# Patient Record
Sex: FEMALE | Race: WHITE | NOT HISPANIC OR LATINO | Employment: OTHER
[De-identification: names, ages, dates, MRNs, and addresses within clinical notes are randomized per-mention and may not be internally consistent; named-entity substitution may affect disease eponyms.]

---

## 2017-03-01 ENCOUNTER — RECORDS - HEALTHEAST (OUTPATIENT)
Dept: ADMINISTRATIVE | Facility: OTHER | Age: 37
End: 2017-03-01

## 2017-03-23 ENCOUNTER — RECORDS - HEALTHEAST (OUTPATIENT)
Dept: ADMINISTRATIVE | Facility: OTHER | Age: 37
End: 2017-03-23

## 2017-03-23 LAB
ABO + RH BLD: NORMAL
BLD GP AB SCN SERPL QL: NEGATIVE
C TRACH DNA SPEC QL PROBE+SIG AMP: NEGATIVE
HBV SURFACE AG SERPL QL IA: NORMAL
HCT VFR BLD AUTO: 39 %
HEMOGLOBIN: 13.2 G/DL (ref 11.7–15.7)
HIV 1+2 AB+HIV1 P24 AG SERPL QL IA: NORMAL
N GONORRHOEA DNA SPEC QL PROBE+SIG AMP: NEGATIVE
PLATELET # BLD AUTO: 229 10^9/L
RUBELLA ANTIBODY IGG QUANTITATIVE: NORMAL IU/ML
T PALLIDUM IGG SER QL: NORMAL

## 2017-04-17 ENCOUNTER — RECORDS - HEALTHEAST (OUTPATIENT)
Dept: ADMINISTRATIVE | Facility: OTHER | Age: 37
End: 2017-04-17

## 2017-04-20 ENCOUNTER — RECORDS - HEALTHEAST (OUTPATIENT)
Dept: ADMINISTRATIVE | Facility: OTHER | Age: 37
End: 2017-04-20

## 2017-05-17 ENCOUNTER — RECORDS - HEALTHEAST (OUTPATIENT)
Dept: ADMINISTRATIVE | Facility: OTHER | Age: 37
End: 2017-05-17

## 2017-05-18 ENCOUNTER — RECORDS - HEALTHEAST (OUTPATIENT)
Dept: ADMINISTRATIVE | Facility: OTHER | Age: 37
End: 2017-05-18

## 2017-06-12 ENCOUNTER — RECORDS - HEALTHEAST (OUTPATIENT)
Dept: ADMINISTRATIVE | Facility: OTHER | Age: 37
End: 2017-06-12

## 2017-06-15 ENCOUNTER — RECORDS - HEALTHEAST (OUTPATIENT)
Dept: ADMINISTRATIVE | Facility: OTHER | Age: 37
End: 2017-06-15

## 2017-06-22 ENCOUNTER — RECORDS - HEALTHEAST (OUTPATIENT)
Dept: ADMINISTRATIVE | Facility: OTHER | Age: 37
End: 2017-06-22

## 2017-06-23 ENCOUNTER — RECORDS - HEALTHEAST (OUTPATIENT)
Dept: ADMINISTRATIVE | Facility: OTHER | Age: 37
End: 2017-06-23

## 2017-06-27 ENCOUNTER — RECORDS - HEALTHEAST (OUTPATIENT)
Dept: ADMINISTRATIVE | Facility: OTHER | Age: 37
End: 2017-06-27

## 2017-06-30 ENCOUNTER — RECORDS - HEALTHEAST (OUTPATIENT)
Dept: ADMINISTRATIVE | Facility: OTHER | Age: 37
End: 2017-06-30

## 2017-07-01 ENCOUNTER — RECORDS - HEALTHEAST (OUTPATIENT)
Dept: ADMINISTRATIVE | Facility: OTHER | Age: 37
End: 2017-07-01

## 2017-07-18 ENCOUNTER — RECORDS - HEALTHEAST (OUTPATIENT)
Dept: ADMINISTRATIVE | Facility: OTHER | Age: 37
End: 2017-07-18

## 2017-07-31 ENCOUNTER — COMMUNICATION - HEALTHEAST (OUTPATIENT)
Dept: ADMINISTRATIVE | Facility: CLINIC | Age: 37
End: 2017-07-31

## 2017-08-24 ENCOUNTER — PRENATAL OFFICE VISIT (OUTPATIENT)
Dept: MIDWIFE SERVICES | Facility: CLINIC | Age: 37
End: 2017-08-24
Payer: COMMERCIAL

## 2017-08-24 VITALS
SYSTOLIC BLOOD PRESSURE: 117 MMHG | BODY MASS INDEX: 27.65 KG/M2 | WEIGHT: 169 LBS | TEMPERATURE: 97.7 F | DIASTOLIC BLOOD PRESSURE: 78 MMHG | HEART RATE: 69 BPM

## 2017-08-24 DIAGNOSIS — O09.529 AMA (ADVANCED MATERNAL AGE) MULTIGRAVIDA 35+, UNSPECIFIED TRIMESTER: Primary | ICD-10-CM

## 2017-08-24 PROBLEM — Z34.00 SUPERVISION OF NORMAL FIRST PREGNANCY: Status: RESOLVED | Noted: 2017-08-24 | Resolved: 2017-08-24

## 2017-08-24 PROBLEM — Z34.00 SUPERVISION OF NORMAL FIRST PREGNANCY: Status: ACTIVE | Noted: 2017-08-24

## 2017-08-24 PROBLEM — Z23 NEED FOR TDAP VACCINATION: Status: ACTIVE | Noted: 2017-08-24

## 2017-08-24 PROBLEM — D25.9 FIBROID UTERUS: Status: ACTIVE | Noted: 2017-08-24

## 2017-08-24 PROCEDURE — 99207 ZZC FIRST OB VISIT: CPT | Performed by: ADVANCED PRACTICE MIDWIFE

## 2017-08-24 NOTE — MR AVS SNAPSHOT
After Visit Summary   8/24/2017    Sophia Maynard    MRN: [id number]           Patient Information     Date Of Birth          1980        Visit Information        Provider Department      8/24/2017 1:30 PM Flaquita Bolden APRN CNM [facility]        Today's Diagnoses     AMA (advanced maternal age) multigravida 35+, unspecified trimester    -  1       Follow-ups after your visit        Who to contact     If you have questions or need follow up information about today's clinic visit or your schedule please contact [facility] directly at [phone].  Normal or non-critical lab and imaging results will be communicated to you by Preventicehart, letter or phone within 4 business days after the clinic has received the results. If you do not hear from us within 7 days, please contact the clinic through SolAeroMedt or phone. If you have a critical or abnormal lab result, we will notify you by phone as soon as possible.  Submit refill requests through Gold Lasso or call your pharmacy and they will forward the refill request to us. Please allow 3 business days for your refill to be completed.          Additional Information About Your Visit        MyChart Information     Gold Lasso gives you secure access to your electronic health record. If you see a primary care provider, you can also send messages to your care team and make appointments. If you have questions, please call your primary care clinic.  If you do not have a primary care provider, please call [phone] and they will assist you.        Care EveryWhere ID     This is your Care EveryWhere ID. This could be used by other organizations to access your [facility] medical records  [id number]        Your Vitals Were     Pulse Temperature Last Period Breastfeeding? BMI (Body Mass Index)       69 97.7  F (36.5  C) (Oral) 12/22/2016 Unknown 27.65 kg/m2        Blood Pressure from Last 3 Encounters:   08/24/17 117/78   09/13/16  128/84   06/07/09 120/78    Weight from Last 3 Encounters:   08/24/17 169 lb (76.7 kg)   09/13/16 148 lb 12.8 oz (67.5 kg)   06/07/09 134 lb 3.2 oz (60.9 kg)              We Performed the Following     ABO and Rh     Anti Treponema     CBC with platelets     Chlamydia trachomatis PCR     Hepatitis B surface antigen     HIV Antigen Antibody Combo     Neisseria gonorrhoeae PCR     OB hemoglobin     Rubella Antibody IgG Quantitative          Today's Medication Changes          These changes are accurate as of: 8/24/17  5:20 PM.  If you have any questions, ask your nurse or doctor.               Stop taking these medicines if you haven't already. Please contact your care team if you have questions.     ZITHROMAX Z-TOBY 250 MG tablet   Generic drug:  azithromycin   Stopped by:  Flaquita Bolden APRN CNM                    Primary Care Provider Office Phone # Fax #    Alexander Muller [phone]       [address] [address]        Equal Access to Services     [facility]: Hadii aad ku hadasho Soomaali, waaxda luqadaha, qaybta kaalmada adeegyada, waxay idiin haymatilda juarez . So [facility] [phone].    ATENCIÓN: Si habla español, tiene a gomez disposición servicios gratuitos de asistencia lingüística. [facility] [phone].    We comply with applicable federal civil rights laws and [place] laws. We do not discriminate on the basis of race, color, national origin, age, disability sex, sexual orientation or gender identity.            Thank you!     Thank you for choosing [facility]  for your care. Our goal is always to provide you with excellent care. Hearing back from our patients is one way we can continue to improve our services. Please take a few minutes to complete the written survey that you may receive in the mail after your visit with us. Thank you!             Your Updated Medication List - Protect others around you: Learn how to safely use,  store and throw away your medicines at www.disposemymeds.org.          This list is accurate as of: 8/24/17  5:20 PM.  Always use your most recent med list.                   Brand Name Dispense Instructions for use Diagnosis    PNV PO           VITAMIN D (CHOLECALCIFEROL) PO      Take by mouth daily

## 2017-08-24 NOTE — PROGRESS NOTES
34w3d   Sophia Maynard is a 37 year old who presents to the clinic for an new ob visit. She is not a previous [facility] patient. Patient is transfer from [facility]. Transferring because of risk of PPH due to fibroid. Patient had ultrasound with a perinatologist center and the fibroids are not by the cervix and only the largest are reported, seven total were seen. Discussed getting an ultrasound at 36 weeks for size and cephalic check due to inaccurate measurements from the fibroids. Patient reports she is measuring consistently 3 weeks ahead and prefers not to get ultrasounds unless they are going to change the plan. She will let us know if she agrees to the [facility] ultrasound at 36 weeks. Also on ultrasound unable to see CSP which can be associated with other brain abnormalities. Recommendation for fetal MRI and follow up ultrasound made. Patient declined further ultrasounds. Patient had birth tour yesterday. She is happy to deliver at [facility], she has heard a lot of good things.    Patient and  have no further questions or concerns today. Feeling well. They declined tdap vaccination today.     Estimated Date of Delivery: Oct 2, 2017 is calculated from Patient's last menstrual period was 12/22/2016.     She has not had bleeding since her LMP.   She has not had nausea. Weigh loss has not occurred.   This was a planned pregnancy.   NAT[place]NA[place] is involved, [place]  OTHER CONCERNS: no concerns     INFECTION HISTORY  HIV: no  Hepatitis B: no  Hepatitis C: no  Syphilis:  no  Tuberculosis: no   PPD- no  Herpes self: no  Herpes partner:  no  Chlamydia:  no  Gonorrhea:  no  HPV: no  BV:  no  Trichomonis:  no  Chicken Pox:  YES  ====================================================  GENETIC SCREENING  Genetic screening reviewed. High Risk? No  ====================================================  PERSONAL/SOCIAL HISTORY  Lives lives with their spouse.  Employment: Full time.  Her job involves light activity .  HX  OF ABUSE: no  =====================================================   REVIEW OF SYSTEMS  C: NEGATIVE for fever, chills  E: NEGATIVE for vision changes   R: NEGATIVE for significant cough or SOB  CV: NEGATIVE for chest pain, palpitations   GI: NEGATIVE for nausea, abdominal pain, heartburn, or change in bowel habits  : NEGATIVE for frequency, dysuria, or hematuria  M: NEGATIVE for significant arthralgias or myalgia  N: NEGATIVE for weakness, dizziness or paresthesias or headache  ====================================================    PHYSICAL EXAM:  /78  Pulse 69  Temp 97.7  F (36.5  C) (Oral)  Wt 169 lb (76.7 kg)  LMP 2016  BMI 27.65 kg/m2  BMI- Body mass index is 27.65 kg/(m^2).,     RECOMMENDED WEIGHT GAIN: 15-25 lbs.  PHQ9- Today's Depression Rating was No Value exists for the : HP#PHQ9  GENERAL:  Pleasant pregnant female, alert, well groomed.   SKIN:  Warm and dry, without lesions or rashes  HEAD: Symmetrical features.  MOUTH:  Buccal mucosa pink, moist without lesions.    NECK:  Thyroid without enlargement and nodules.  Lymph nodes not palpable.   LUNGS:  Clear to auscultation.  HEART:  RRR without murmur.  ABDOMEN: Soft without masses , tenderness or organomegaly.  No CVA tenderness. No scars noted.     MUSCULOSKELETAL:  Full range of motion  EXTREMITIES:  No edema. No significant varicosities.   GENITALIA: deferred.    =========================================  ASSESSMENT:  34w3d  AMA, first pregnancy   Fibroids    PREGNANCY AT RISK? no  ==========================================  PLAN:  Instructed on use of triage nurse line and contacting the on call CNM after hours for an urgent need such as fever, vagina bleeding, bladder or vaginal infection, rupture of membranes,  or term labor.    Instructed on best evidence for: weight gain for her BMI for pregnancy; healthy diet and foods to avoid; exercise and activity during pregnancy;avoiding exposure to toxoplasmosis; and  maintenance of a generally healthy lifestyle.   Discussed the harms, benefits, side effects and alternative therapies for current prescribed and OTC medications.  Follow up in 2 weeks for GBS and HGB. Discussed with patient recommendation for ultrasound at 36 weeks to follow up size and follow up fibroids and CSP evaluation. Patient and  have declined at this time. Will let us know if they change their mind.     TERRANCE Nunn CNM

## 2017-09-07 ENCOUNTER — PRENATAL OFFICE VISIT (OUTPATIENT)
Dept: MIDWIFE SERVICES | Facility: CLINIC | Age: 37
End: 2017-09-07
Payer: COMMERCIAL

## 2017-09-07 VITALS
TEMPERATURE: 97 F | DIASTOLIC BLOOD PRESSURE: 73 MMHG | BODY MASS INDEX: 28.47 KG/M2 | HEART RATE: 96 BPM | WEIGHT: 174 LBS | SYSTOLIC BLOOD PRESSURE: 114 MMHG

## 2017-09-07 DIAGNOSIS — O09.529 AMA (ADVANCED MATERNAL AGE) MULTIGRAVIDA 35+, UNSPECIFIED TRIMESTER: Primary | ICD-10-CM

## 2017-09-07 DIAGNOSIS — Z23 NEED FOR TDAP VACCINATION: ICD-10-CM

## 2017-09-07 LAB — HGB BLD-MCNC: 12.2 G/DL (ref 11.7–15.7)

## 2017-09-07 PROCEDURE — 87186 SC STD MICRODIL/AGAR DIL: CPT | Performed by: ADVANCED PRACTICE MIDWIFE

## 2017-09-07 PROCEDURE — 00000218 ZZHCL STATISTIC OBHBG - HEMOGLOBIN: Performed by: ADVANCED PRACTICE MIDWIFE

## 2017-09-07 PROCEDURE — 87653 STREP B DNA AMP PROBE: CPT | Performed by: ADVANCED PRACTICE MIDWIFE

## 2017-09-07 PROCEDURE — 99207 ZZC PRENATAL VISIT: CPT | Performed by: ADVANCED PRACTICE MIDWIFE

## 2017-09-07 PROCEDURE — 36416 COLLJ CAPILLARY BLOOD SPEC: CPT | Performed by: ADVANCED PRACTICE MIDWIFE

## 2017-09-07 RX ORDER — CHOLECALCIFEROL (VITAMIN D3) 125 MCG
CAPSULE ORAL
COMMUNITY
End: 2017-11-16

## 2017-09-07 NOTE — MR AVS SNAPSHOT
After Visit Summary   9/7/2017    Sophia Maynard    MRN: [id number]           Patient Information     Date Of Birth          1980        Visit Information        Provider Department      9/7/2017 10:15 AM Flaquita Bolden APRN CNM [facility]        Today's Diagnoses     AMA (advanced maternal age) multigravida 35+, unspecified trimester    -  1    Need for Tdap vaccination           Follow-ups after your visit        Who to contact     If you have questions or need follow up information about today's clinic visit or your schedule please contact [facility] directly at [phone].  Normal or non-critical lab and imaging results will be communicated to you by NantMobilehart, letter or phone within 4 business days after the clinic has received the results. If you do not hear from us within 7 days, please contact the clinic through NantMobilehart or phone. If you have a critical or abnormal lab result, we will notify you by phone as soon as possible.  Submit refill requests through [employer] or call your pharmacy and they will forward the refill request to us. Please allow 3 business days for your refill to be completed.          Additional Information About Your Visit        MyChart Information     [employer] gives you secure access to your electronic health record. If you see a primary care provider, you can also send messages to your care team and make appointments. If you have questions, please call your primary care clinic.  If you do not have a primary care provider, please call [phone] and they will assist you.        Care EveryWhere ID     This is your Care EveryWhere ID. This could be used by other organizations to access your [facility] medical records  [id number]        Your Vitals Were     Pulse Temperature Last Period BMI (Body Mass Index)          96 97  F (36.1  C) (Oral) 12/22/2016 28.47 kg/m2         Blood Pressure from Last 3 Encounters:   09/07/17 114/73    08/24/17 117/78   09/13/16 128/84    Weight from Last 3 Encounters:   09/07/17 174 lb (78.9 kg)   08/24/17 169 lb (76.7 kg)   09/13/16 148 lb 12.8 oz (67.5 kg)              We Performed the Following     Group B strep PCR     OB hemoglobin        Primary Care Provider Office Phone # Fax #    Alexander Muller [phone]       [address] [address]        Equal Access to Services     [facility] : Hadii aad ku hadasho Soomaali, waaxda luqadaha, qaybta kaalmada ademiguelinayabradly, mady juarez . So [facility] [phone].    ATENCIÓN: Si habla español, tiene a gomez disposición servicios gratuitos de asistencia lingüística. [facility] [phone].    We comply with applicable federal civil rights laws and [place] laws. We do not discriminate on the basis of race, color, national origin, age, disability sex, sexual orientation or gender identity.            Thank you!     Thank you for choosing [facility]  [place] your [place]. Our goal is always to provide you with excellent care. Hearing back from our patients is one way we can continue to improve our services. Please take a few minutes to complete the written survey that you may receive in the mail after your visit with us. Thank you!             Your Updated Medication List - Protect others around you: Learn how to safely use, store and throw away your medicines at www.disposemymeds.org.          This list is accurate as of: 9/7/17 11:48 AM.  Always use your most recent med list.                   Brand Name Dispense Instructions for use Diagnosis    DHA OMEGA 3 PO           PNV PO           PROBIOTIC ACIDOPHILUS Caps           VITAMIN D (CHOLECALCIFEROL) PO      Take by mouth daily

## 2017-09-07 NOTE — PROGRESS NOTES
36w3d  Patient feeling well. Positive fetal movement. Denies water leaking, vaginal bleeding, decreased fetal movement, contraction pain, or headaches.   Doing well and feeling well. Feeling more criselda radford contractions and lower pelvic pressure. She has no concerns today. Fetoscope only for fetal heart, good acceleration heard during exam. Hard to do leopolds with fibroids. Patient declined official ultrasound for size and cephalic check. Discussed it would be best to at least do a BSUS to check for position. She agrees to this but would like to wait until next visit.   GBS and HGB today. Declined tdap vaccination.    Danger signs reviewed, pre-eclampsia signs and symptoms discussed.   Knows when to call triage and has phone numbers.   Follow up in 1 week.   Flaquita Bolden CNM

## 2017-09-08 LAB
GP B STREP DNA SPEC QL NAA+PROBE: POSITIVE
SPECIMEN SOURCE: ABNORMAL

## 2017-09-12 PROBLEM — O99.820 GBS (GROUP B STREPTOCOCCUS CARRIER), +RV CULTURE, CURRENTLY PREGNANT: Status: ACTIVE | Noted: 2017-09-12

## 2017-09-12 LAB
BACTERIA SPEC CULT: ABNORMAL
SPECIMEN SOURCE: ABNORMAL

## 2017-09-14 ENCOUNTER — PRENATAL OFFICE VISIT (OUTPATIENT)
Dept: MIDWIFE SERVICES | Facility: CLINIC | Age: 37
End: 2017-09-14
Payer: COMMERCIAL

## 2017-09-14 VITALS
DIASTOLIC BLOOD PRESSURE: 70 MMHG | TEMPERATURE: 99.2 F | OXYGEN SATURATION: 100 % | SYSTOLIC BLOOD PRESSURE: 107 MMHG | HEART RATE: 93 BPM | WEIGHT: 175 LBS | BODY MASS INDEX: 28.63 KG/M2

## 2017-09-14 DIAGNOSIS — Z34.03 ENCOUNTER FOR SUPERVISION OF NORMAL FIRST PREGNANCY IN THIRD TRIMESTER: Primary | ICD-10-CM

## 2017-09-14 PROCEDURE — 99207 ZZC PRENATAL VISIT: CPT | Performed by: ADVANCED PRACTICE MIDWIFE

## 2017-09-14 NOTE — MR AVS SNAPSHOT
After Visit Summary   9/14/2017    Sophia Maynard    MRN: [id number]           Patient Information     Date Of Birth          1980        Visit Information        Provider Department      9/14/2017 10:15 AM Flaquita Bolden APRN CNM [facility]        Today's Diagnoses     Encounter for supervision of normal first pregnancy in third trimester    -  1       Follow-ups after your visit        Who to contact     If you have questions or need follow up information about today's clinic visit or your schedule please contact [facility] directly at [phone].  Normal or non-critical lab and imaging results will be communicated to you by Stack Exchangehart, letter or phone within 4 business days after the clinic has received the results. If you do not hear from us within 7 days, please contact the clinic through Stack Exchangehart or phone. If you have a critical or abnormal lab result, we will notify you by phone as soon as possible.  Submit refill requests through Guardian Analytics or call your pharmacy and they will forward the refill request to us. Please allow 3 business days for your refill to be completed.          Additional Information About Your Visit        MyChart Information     [employer] gives you secure access to your electronic health record. If you see a primary care provider, you can also send messages to your care team and make appointments. If you have questions, please call your primary care clinic.  If you do not have a primary care provider, please call [phone] and they will assist you.        Care EveryWhere ID     This is your Care EveryWhere ID. This could be used by other organizations to access your [facility] medical records  [id number]        Your Vitals Were     Pulse Temperature Last Period Pulse Oximetry BMI (Body Mass Index)       93 99.2  F (37.3  C) (Oral) 12/22/2016 100% 28.63 kg/m2        Blood Pressure from Last 3 Encounters:   09/14/17 107/70   09/07/17  114/73   08/24/17 117/78    Weight from Last 3 Encounters:   09/14/17 175 lb (79.4 kg)   09/07/17 174 lb (78.9 kg)   08/24/17 169 lb (76.7 kg)              Today, you had the following     No orders found for display       Primary Care Provider Office Phone # Fax #    Alexander Muller [phone]       [address] [address]        Equal Access to Services     [facility] : Hadii aad ku hadasho Soomaali, waaxda luqadaha, qaybta kaalmada adeegyada, waxay idiin hayaan adeeg brandi juarez . So [facility] [phone].    ATENCIÓN: Si habla español, tiene a gomez disposición servicios gratuitos de asistencia lingüística. [facility] [phone].    We comply with applicable federal civil rights laws and [place] laws. We do not discriminate on the basis of race, color, national origin, age, disability sex, sexual orientation or gender identity.            Thank you!     Thank you for choosing [facility]  for your care. Our goal is always to provide you with excellent care. Hearing back from our patients is one way we can continue to improve our services. Please take a few minutes to complete the written survey that you may receive in the mail after your visit with us. Thank you!             Your Updated Medication List - Protect others around you: Learn how to safely use, store and throw away your medicines at www.disposemymeds.org.          This list is accurate as of: 9/14/17  6:23 PM.  Always use your most recent med list.                   Brand Name Dispense Instructions for use Diagnosis    DHA OMEGA 3 PO           PNV PO           PROBIOTIC ACIDOPHILUS Caps           VITAMIN D (CHOLECALCIFEROL) PO      Take by mouth daily

## 2017-09-19 ENCOUNTER — PRENATAL OFFICE VISIT (OUTPATIENT)
Dept: MIDWIFE SERVICES | Facility: CLINIC | Age: 37
End: 2017-09-19
Payer: COMMERCIAL

## 2017-09-19 VITALS
TEMPERATURE: 97.5 F | SYSTOLIC BLOOD PRESSURE: 98 MMHG | WEIGHT: 176 LBS | DIASTOLIC BLOOD PRESSURE: 64 MMHG | BODY MASS INDEX: 28.8 KG/M2

## 2017-09-19 DIAGNOSIS — O09.529 AMA (ADVANCED MATERNAL AGE) MULTIGRAVIDA 35+, UNSPECIFIED TRIMESTER: Primary | ICD-10-CM

## 2017-09-19 PROCEDURE — 99207 ZZC PRENATAL VISIT: CPT | Performed by: ADVANCED PRACTICE MIDWIFE

## 2017-09-19 NOTE — MR AVS SNAPSHOT
After Visit Summary   9/19/2017    Sophia Maynard    MRN: [id number]           Patient Information     Date Of Birth          1980        Visit Information        Provider Department      9/19/2017 11:00 AM Merlin Henry APRN CNM [facility]        Today's Diagnoses     AMA (advanced maternal age) multigravida 35+, unspecified trimester    -  1       Follow-ups after your visit        Who to contact     If you have questions or need follow up information about today's clinic visit or your schedule please contact [facility] directly at [phone].  Normal or non-critical lab and imaging results will be communicated to you by Vaccsyshart, letter or phone within 4 business days after the clinic has received the results. If you do not hear from us within 7 days, please contact the clinic through Whiteyboardt or phone. If you have a critical or abnormal lab result, we will notify you by phone as soon as possible.  Submit refill requests through Aut[employer] or call your pharmacy and they will forward the refill request to us. Please allow 3 business days for your refill to be completed.          Additional Information About Your Visit        MyChart Information     [employer] gives you secure access to your electronic health record. If you see a primary care provider, you can also send messages to your care team and make appointments. If you have questions, please call your primary care clinic.  If you do not have a primary care provider, please call [phone] and they will assist you.        Care EveryWhere ID     This is your Care EveryWhere ID. This could be used by other organizations to access your [facility] medical records  [id number]        Your Vitals Were     Temperature Last Period BMI (Body Mass Index)             97.5  F (36.4  C) (Oral) 12/22/2016 28.8 kg/m2          Blood Pressure from Last 3 Encounters:   09/19/17 98/64   09/14/17 107/70   09/07/17 114/73    Weight  from Last 3 Encounters:   09/19/17 176 lb (79.8 kg)   09/14/17 175 lb (79.4 kg)   09/07/17 174 lb (78.9 kg)              Today, you had the following     No orders found for display       Primary Care Provider Office Phone # Fax #    Alexander Muller [phone]       [address] [address]        Equal Access to Services     [facility] : Hadii aad ku hadasho Soomaali, waaxda luqadaha, qaybta kaalmada adeegyada, mady obando hayjanesn cristopher juarez . So [facility] [phone].    ATENCIÓN: Si habla español, tiene a gomez disposición servicios gratuitos de asistencia lingüística. [facility] [phone].    We comply with applicable federal civil rights laws and [place] laws. We do not discriminate on the basis of race, color, national origin, age, disability sex, sexual orientation or gender identity.            Thank you!     Thank you for choosing [facility]  for your [place]. Our goal is always to provide you with excellent care. Hearing back from our patients is one way we can continue to improve our services. Please take a few minutes to complete the written survey that you may receive in the mail after your visit with us. Thank you!             Your Updated Medication List - Protect others around you: Learn how to safely use, store and throw away your medicines at www.disposemymeds.org.          This list is accurate as of: 9/19/17 11:54 AM.  Always use your most recent med list.                   Brand Name Dispense Instructions for use Diagnosis    DHA OMEGA 3 PO           PNV PO           PROBIOTIC ACIDOPHILUS Caps           VITAMIN D (CHOLECALCIFEROL) PO      Take by mouth daily

## 2017-09-19 NOTE — PROGRESS NOTES
38w1d  Patient feeling well.  Reports good fetal movement and irregular criselda radford contractions  Denies bleeding, leaking of fluid, painful contractions, and headaches.  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  Continues to have body work including adjustments and massage to manage pregnancy discomforts.    Discussed labor precautions and plan for labor support.  Has personal .  Return to clinic in 1 week.    Merlin Henry APRN, CNM

## 2017-09-24 ENCOUNTER — HEALTH MAINTENANCE LETTER (OUTPATIENT)
Age: 37
End: 2017-09-24

## 2017-09-29 ENCOUNTER — PRENATAL OFFICE VISIT (OUTPATIENT)
Dept: MIDWIFE SERVICES | Facility: CLINIC | Age: 37
End: 2017-09-29
Payer: COMMERCIAL

## 2017-09-29 VITALS
WEIGHT: 179 LBS | DIASTOLIC BLOOD PRESSURE: 76 MMHG | SYSTOLIC BLOOD PRESSURE: 110 MMHG | TEMPERATURE: 97.3 F | OXYGEN SATURATION: 97 % | BODY MASS INDEX: 29.29 KG/M2 | HEART RATE: 114 BPM

## 2017-09-29 DIAGNOSIS — O09.523 AMA (ADVANCED MATERNAL AGE) MULTIGRAVIDA 35+, THIRD TRIMESTER: ICD-10-CM

## 2017-09-29 DIAGNOSIS — O99.820 GBS (GROUP B STREPTOCOCCUS CARRIER), +RV CULTURE, CURRENTLY PREGNANT: Primary | ICD-10-CM

## 2017-09-29 DIAGNOSIS — O48.0 POST-TERM PREGNANCY, 40-42 WEEKS OF GESTATION: ICD-10-CM

## 2017-09-29 PROCEDURE — 99207 ZZC PRENATAL VISIT: CPT | Performed by: ADVANCED PRACTICE MIDWIFE

## 2017-09-29 PROCEDURE — 59425 ANTEPARTUM CARE ONLY: CPT | Performed by: ADVANCED PRACTICE MIDWIFE

## 2017-09-29 NOTE — MR AVS SNAPSHOT
After Visit Summary   9/29/2017    Sophia Maynard    MRN: [id number]           Patient Information     Date Of Birth          1980        Visit Information        Provider Department      9/29/2017 10:30 AM Merlin Henry APRN CNM [facility]        Today's Diagnoses     GBS (group B Streptococcus carrier), +RV culture, currently pregnant    -  1    AMA (advanced maternal age) multigravida 35+, third trimester        Post-term pregnancy, 40-42 weeks of gestation           Follow-ups after your visit        Your next 10 appointments already scheduled     Oct 09, 2017 11:40 AM CDT   US OB SINGLE FOLLOW UP REPEAT with RDUS1   [facility]    [address] [place] [address] [address]  [facility] [address] [phone]           Please bring a list of your medicines (including vitamins, minerals and over-the-counter drugs). Also, tell your doctor about any allergies you may have. Wear comfortable clothes and leave your valuables at home.  If you re less than 20 weeks drink four 8-ounce glasses of fluid an hour before your exam. If you need to empty your bladder before your exam, try to release only a little urine. Then, drink another glass of fluid.  You may have up to two family members in the exam room. If you bring a small child, an adult must be there to care for him or her.  Please call the Imaging Department at your exam site with any questions.            Oct 09, 2017 12:30 PM CDT   ESTABLISHED PRENATAL with TERRANCE Munoz CNM, RD NON STRESS TEST RM   [facility] [place]    [address] [place] [address] [address]  [facility] [address] [phone]              Future tests that were ordered for you today     Open Future Orders        Priority Expected Expires Ordered    US OB Single Follow Up Repeat Routine 10/9/2017 9/29/2018 9/29/2017            Who to contact     If you have questions or need  follow up information about today's clinic visit or your schedule please contact [facility] directly at [phone].  Normal or non-critical lab and imaging results will be communicated to you by MyChart, letter or phone within 4 business days after the clinic has received the results. If you do not hear from us within 7 days, please contact the clinic through [employer] or phone. If you have a critical or abnormal lab result, we will notify you by phone as soon as possible.  Submit refill requests through [employer] or call your pharmacy and they will forward the refill request to us. Please allow 3 business days for your refill to be completed.          Additional Information About Your Visit        [employer] Information     [employer] gives you secure access to your electronic health record. If you see a primary care provider, you can also send messages to your care team and make appointments. If you have questions, please call your primary care clinic.  If you do not have a primary care provider, please call [phone] and they will assist you.        Care EveryWhere ID     This is your Care EveryWhere ID. This could be used by other organizations to access your [facility] medical records  [id number]        Your Vitals Were     Pulse Temperature Last Period Pulse Oximetry BMI (Body Mass Index)       114 97.3  F (36.3  C) (Oral) 12/22/2016 97% 29.29 kg/m2        Blood Pressure from Last 3 Encounters:   09/29/17 110/76   09/19/17 98/64   09/14/17 107/70    Weight from Last 3 Encounters:   09/29/17 179 lb (81.2 kg)   09/19/17 176 lb (79.8 kg)   09/14/17 175 lb (79.4 kg)               Primary Care Provider Office Phone # Fax #    Alexander Muller [phone]       [address] [address]        Equal Access to Services     DICK CASAS : Luna Jiménez, waaxda luqadaha, qaybta kaalmabradly gordon, mady alonzo. So [facility]  [phone].    ATENCIÓN: Si nataliya dias, tiene a gomez disposición servicios gratuitos de asistencia lingüística. Anahi al [phone].    We comply with applicable federal civil rights laws and [place] laws. We do not discriminate on the basis of race, color, national origin, age, disability, sex, sexual orientation, or gender identity.            Thank you!     Thank you for choosing [facility]  [place]. Our goal is always to provide you with excellent care. Hearing back from our patients is one way we can continue to improve our services. Please take a few minutes to complete the written survey that you may receive in the mail after your visit with us. Thank you!             Your Updated Medication List - Protect others around you: Learn how to safely use, store and throw away your medicines at www.disposemymeds.org.          This list is accurate as of: 9/29/17 12:27 PM.  Always use your most recent med list.                   Brand Name Dispense Instructions for use Diagnosis    DHA OMEGA 3 PO           PNV PO           PROBIOTIC ACIDOPHILUS Caps           VITAMIN D (CHOLECALCIFEROL) PO      Take by mouth daily

## 2017-09-29 NOTE — PROGRESS NOTES
Doing well.  Is off work on Maternity leave but has house closing 10/16/17 so still has lots to do.  Recommended sleeping as much as possible.  Discussed post dates mgt.  Would want to go to 42 weeks before IOL.  BP is WNL.  Reviewed S/S of PIH.  ASSESSMENT: 39w4d   Fibroid uterus.  GBS positive.   PLAN: RTC on 10/9 at 41 wks for post dates mgt.    RADHA

## 2017-10-04 ENCOUNTER — TELEPHONE (OUTPATIENT)
Dept: MIDWIFE SERVICES | Facility: CLINIC | Age: 37
End: 2017-10-04

## 2017-10-04 NOTE — TELEPHONE ENCOUNTER
Patient is 40w2d, calling in regards to losing part of her mucus plug last night. Had cervical mucus discharge with a pink tinge when wiping. No contractions, no LOF, +FM. Patient is GBS+, Rh neg. Discussed when to call in regards to laboring. Routing to [facility] as FYI. Thanks.   Yoselin Valdes, RN-BSN

## 2017-10-06 ENCOUNTER — HOSPITAL ENCOUNTER (INPATIENT)
Facility: CLINIC | Age: 37
LOS: 2 days | Discharge: HOME | End: 2017-10-08
Attending: ADVANCED PRACTICE MIDWIFE | Admitting: ADVANCED PRACTICE MIDWIFE
Payer: COMMERCIAL

## 2017-10-06 ENCOUNTER — NURSE TRIAGE (OUTPATIENT)
Dept: NURSING | Facility: CLINIC | Age: 37
End: 2017-10-06

## 2017-10-06 LAB
ABO + RH BLD: NORMAL
ABO + RH BLD: NORMAL
BLD GP AB SCN SERPL QL: NORMAL
BLOOD BANK CMNT PATIENT-IMP: NORMAL
BLOOD BANK CMNT PATIENT-IMP: NORMAL
HGB BLD-MCNC: 13.2 G/DL (ref 11.7–15.7)
SPECIMEN EXP DATE BLD: NORMAL
T PALLIDUM IGG+IGM SER QL: NEGATIVE

## 2017-10-06 PROCEDURE — S0191 MISOPROSTOL, ORAL, 200 MCG: HCPCS

## 2017-10-06 PROCEDURE — 25000128 H RX IP 250 OP 636: Performed by: ADVANCED PRACTICE MIDWIFE

## 2017-10-06 PROCEDURE — 0KQM0ZZ REPAIR PERINEUM MUSCLE, OPEN APPROACH: ICD-10-PCS | Performed by: ADVANCED PRACTICE MIDWIFE

## 2017-10-06 PROCEDURE — 86900 BLOOD TYPING SEROLOGIC ABO: CPT | Performed by: ADVANCED PRACTICE MIDWIFE

## 2017-10-06 PROCEDURE — 12000030 ZZH R&B OB INTERMEDIATE UMMC

## 2017-10-06 PROCEDURE — 25000132 ZZH RX MED GY IP 250 OP 250 PS 637: Performed by: ADVANCED PRACTICE MIDWIFE

## 2017-10-06 PROCEDURE — 72200001 ZZH LABOR CARE VAGINAL DELIVERY SINGLE

## 2017-10-06 PROCEDURE — 86780 TREPONEMA PALLIDUM: CPT | Performed by: ADVANCED PRACTICE MIDWIFE

## 2017-10-06 PROCEDURE — 85018 HEMOGLOBIN: CPT | Performed by: ADVANCED PRACTICE MIDWIFE

## 2017-10-06 PROCEDURE — 86901 BLOOD TYPING SEROLOGIC RH(D): CPT | Performed by: ADVANCED PRACTICE MIDWIFE

## 2017-10-06 PROCEDURE — 59410 OBSTETRICAL CARE: CPT | Performed by: ADVANCED PRACTICE MIDWIFE

## 2017-10-06 PROCEDURE — 25000125 ZZHC RX 250

## 2017-10-06 PROCEDURE — 25000125 ZZHC RX 250: Performed by: ADVANCED PRACTICE MIDWIFE

## 2017-10-06 PROCEDURE — 25000132 ZZH RX MED GY IP 250 OP 250 PS 637

## 2017-10-06 PROCEDURE — 36415 COLL VENOUS BLD VENIPUNCTURE: CPT | Performed by: ADVANCED PRACTICE MIDWIFE

## 2017-10-06 PROCEDURE — 86850 RBC ANTIBODY SCREEN: CPT | Performed by: ADVANCED PRACTICE MIDWIFE

## 2017-10-06 RX ORDER — HYDROCORTISONE 2.5 %
CREAM (GRAM) TOPICAL 3 TIMES DAILY PRN
Status: DISCONTINUED | OUTPATIENT
Start: 2017-10-06 | End: 2017-10-08 | Stop reason: HOSPADM

## 2017-10-06 RX ORDER — AMOXICILLIN 250 MG
1-2 CAPSULE ORAL 2 TIMES DAILY
Status: DISCONTINUED | OUTPATIENT
Start: 2017-10-06 | End: 2017-10-08 | Stop reason: HOSPADM

## 2017-10-06 RX ORDER — ACETAMINOPHEN 325 MG/1
650 TABLET ORAL EVERY 4 HOURS PRN
Status: DISCONTINUED | OUTPATIENT
Start: 2017-10-06 | End: 2017-10-08 | Stop reason: HOSPADM

## 2017-10-06 RX ORDER — LANOLIN 100 %
OINTMENT (GRAM) TOPICAL
Status: DISCONTINUED | OUTPATIENT
Start: 2017-10-06 | End: 2017-10-08 | Stop reason: HOSPADM

## 2017-10-06 RX ORDER — BISACODYL 10 MG
10 SUPPOSITORY, RECTAL RECTAL DAILY PRN
Status: DISCONTINUED | OUTPATIENT
Start: 2017-10-08 | End: 2017-10-08 | Stop reason: HOSPADM

## 2017-10-06 RX ORDER — METHYLERGONOVINE MALEATE 0.2 MG/ML
200 INJECTION INTRAVENOUS
Status: DISCONTINUED | OUTPATIENT
Start: 2017-10-06 | End: 2017-10-06

## 2017-10-06 RX ORDER — OXYCODONE HYDROCHLORIDE 5 MG/1
5-10 TABLET ORAL
Status: DISCONTINUED | OUTPATIENT
Start: 2017-10-06 | End: 2017-10-08 | Stop reason: HOSPADM

## 2017-10-06 RX ORDER — NALOXONE HYDROCHLORIDE 0.4 MG/ML
.1-.4 INJECTION, SOLUTION INTRAMUSCULAR; INTRAVENOUS; SUBCUTANEOUS
Status: DISCONTINUED | OUTPATIENT
Start: 2017-10-06 | End: 2017-10-06

## 2017-10-06 RX ORDER — OXYTOCIN/0.9 % SODIUM CHLORIDE 30/500 ML
100-340 PLASTIC BAG, INJECTION (ML) INTRAVENOUS CONTINUOUS PRN
Status: DISCONTINUED | OUTPATIENT
Start: 2017-10-06 | End: 2017-10-06

## 2017-10-06 RX ORDER — PENICILLIN G POTASSIUM 5000000 [IU]/1
5 INJECTION, POWDER, FOR SOLUTION INTRAMUSCULAR; INTRAVENOUS ONCE
Status: COMPLETED | OUTPATIENT
Start: 2017-10-06 | End: 2017-10-06

## 2017-10-06 RX ORDER — OXYTOCIN 10 [USP'U]/ML
10 INJECTION, SOLUTION INTRAMUSCULAR; INTRAVENOUS
Status: DISCONTINUED | OUTPATIENT
Start: 2017-10-06 | End: 2017-10-08 | Stop reason: HOSPADM

## 2017-10-06 RX ORDER — SODIUM CHLORIDE, SODIUM LACTATE, POTASSIUM CHLORIDE, CALCIUM CHLORIDE 600; 310; 30; 20 MG/100ML; MG/100ML; MG/100ML; MG/100ML
INJECTION, SOLUTION INTRAVENOUS CONTINUOUS
Status: DISCONTINUED | OUTPATIENT
Start: 2017-10-06 | End: 2017-10-06

## 2017-10-06 RX ORDER — MISOPROSTOL 200 UG/1
TABLET ORAL
Status: COMPLETED
Start: 2017-10-06 | End: 2017-10-06

## 2017-10-06 RX ORDER — IBUPROFEN 400 MG/1
400-800 TABLET, FILM COATED ORAL EVERY 6 HOURS PRN
Status: DISCONTINUED | OUTPATIENT
Start: 2017-10-06 | End: 2017-10-08 | Stop reason: HOSPADM

## 2017-10-06 RX ORDER — LIDOCAINE HYDROCHLORIDE 10 MG/ML
INJECTION, SOLUTION EPIDURAL; INFILTRATION; INTRACAUDAL; PERINEURAL
Status: COMPLETED
Start: 2017-10-06 | End: 2017-10-06

## 2017-10-06 RX ORDER — ACETAMINOPHEN 325 MG/1
650 TABLET ORAL EVERY 4 HOURS PRN
Status: DISCONTINUED | OUTPATIENT
Start: 2017-10-06 | End: 2017-10-06

## 2017-10-06 RX ORDER — FENTANYL CITRATE 50 UG/ML
50-100 INJECTION, SOLUTION INTRAMUSCULAR; INTRAVENOUS
Status: DISCONTINUED | OUTPATIENT
Start: 2017-10-06 | End: 2017-10-06

## 2017-10-06 RX ORDER — OXYTOCIN 10 [USP'U]/ML
10 INJECTION, SOLUTION INTRAMUSCULAR; INTRAVENOUS
Status: DISCONTINUED | OUTPATIENT
Start: 2017-10-06 | End: 2017-10-06

## 2017-10-06 RX ORDER — OXYTOCIN/0.9 % SODIUM CHLORIDE 30/500 ML
340 PLASTIC BAG, INJECTION (ML) INTRAVENOUS CONTINUOUS PRN
Status: DISCONTINUED | OUTPATIENT
Start: 2017-10-06 | End: 2017-10-08 | Stop reason: HOSPADM

## 2017-10-06 RX ORDER — OXYTOCIN 10 [USP'U]/ML
INJECTION, SOLUTION INTRAMUSCULAR; INTRAVENOUS
Status: DISCONTINUED
Start: 2017-10-06 | End: 2017-10-06 | Stop reason: HOSPADM

## 2017-10-06 RX ORDER — CARBOPROST TROMETHAMINE 250 UG/ML
250 INJECTION, SOLUTION INTRAMUSCULAR
Status: DISCONTINUED | OUTPATIENT
Start: 2017-10-06 | End: 2017-10-06

## 2017-10-06 RX ORDER — OXYTOCIN/0.9 % SODIUM CHLORIDE 30/500 ML
PLASTIC BAG, INJECTION (ML) INTRAVENOUS
Status: DISCONTINUED
Start: 2017-10-06 | End: 2017-10-06 | Stop reason: HOSPADM

## 2017-10-06 RX ORDER — METHYLERGONOVINE MALEATE 0.2 MG/1
200 TABLET ORAL EVERY 6 HOURS SCHEDULED
Status: DISCONTINUED | OUTPATIENT
Start: 2017-10-06 | End: 2017-10-08 | Stop reason: HOSPADM

## 2017-10-06 RX ORDER — IBUPROFEN 800 MG/1
800 TABLET, FILM COATED ORAL
Status: DISCONTINUED | OUTPATIENT
Start: 2017-10-06 | End: 2017-10-06

## 2017-10-06 RX ORDER — NALOXONE HYDROCHLORIDE 0.4 MG/ML
.1-.4 INJECTION, SOLUTION INTRAMUSCULAR; INTRAVENOUS; SUBCUTANEOUS
Status: DISCONTINUED | OUTPATIENT
Start: 2017-10-06 | End: 2017-10-08 | Stop reason: HOSPADM

## 2017-10-06 RX ORDER — OXYCODONE AND ACETAMINOPHEN 5; 325 MG/1; MG/1
1 TABLET ORAL
Status: DISCONTINUED | OUTPATIENT
Start: 2017-10-06 | End: 2017-10-06

## 2017-10-06 RX ORDER — ONDANSETRON 2 MG/ML
4 INJECTION INTRAMUSCULAR; INTRAVENOUS EVERY 6 HOURS PRN
Status: DISCONTINUED | OUTPATIENT
Start: 2017-10-06 | End: 2017-10-06

## 2017-10-06 RX ORDER — MISOPROSTOL 200 UG/1
400 TABLET ORAL
Status: DISCONTINUED | OUTPATIENT
Start: 2017-10-06 | End: 2017-10-08 | Stop reason: HOSPADM

## 2017-10-06 RX ORDER — OXYTOCIN/0.9 % SODIUM CHLORIDE 30/500 ML
100 PLASTIC BAG, INJECTION (ML) INTRAVENOUS CONTINUOUS
Status: DISCONTINUED | OUTPATIENT
Start: 2017-10-06 | End: 2017-10-08 | Stop reason: HOSPADM

## 2017-10-06 RX ADMIN — OXYTOCIN-SODIUM CHLORIDE 0.9% IV SOLN 30 UNIT/500ML 100 ML/HR: 30-0.9/5 SOLUTION at 14:37

## 2017-10-06 RX ADMIN — MISOPROSTOL 400 MCG: 200 TABLET ORAL at 12:34

## 2017-10-06 RX ADMIN — LIDOCAINE HYDROCHLORIDE 10 ML: 10 INJECTION, SOLUTION EPIDURAL; INFILTRATION; INTRACAUDAL; PERINEURAL at 12:43

## 2017-10-06 RX ADMIN — PENICILLIN G POTASSIUM 5 MILLION UNITS: 5000000 POWDER, FOR SOLUTION INTRAMUSCULAR; INTRAPLEURAL; INTRATHECAL; INTRAVENOUS at 10:25

## 2017-10-06 RX ADMIN — IBUPROFEN 800 MG: 800 TABLET ORAL at 13:35

## 2017-10-06 RX ADMIN — ACETAMINOPHEN 650 MG: 325 TABLET, FILM COATED ORAL at 17:56

## 2017-10-06 RX ADMIN — ACETAMINOPHEN 650 MG: 325 TABLET, FILM COATED ORAL at 22:18

## 2017-10-06 RX ADMIN — METHYLERGONOVINE MALEATE 200 MCG: 0.2 TABLET ORAL at 14:14

## 2017-10-06 RX ADMIN — IBUPROFEN 800 MG: 800 TABLET ORAL at 20:21

## 2017-10-06 RX ADMIN — OXYTOCIN-SODIUM CHLORIDE 0.9% IV SOLN 30 UNIT/500ML 100 ML/HR: 30-0.9/5 SOLUTION at 12:40

## 2017-10-06 RX ADMIN — METHYLERGONOVINE MALEATE 200 MCG: 0.2 TABLET ORAL at 20:21

## 2017-10-06 RX ADMIN — FENTANYL CITRATE 100 MCG: 50 INJECTION INTRAMUSCULAR; INTRAVENOUS at 12:43

## 2017-10-06 NOTE — PLAN OF CARE
Unable to obtain IV access. Anesthesia contacted. Pt GBS+ and has multiple fibroids. Plan to continue to attempt access.

## 2017-10-06 NOTE — PLAN OF CARE
Problem: Labor (Cervical Ripen, Induct, Augment) (Adult,Obstetrics,Pediatric)  Goal: Signs and Symptoms of Listed Potential Problems Will be Absent, Minimized or Managed (Labor)  Signs and symptoms of listed potential problems will be absent, minimized or managed by discharge/transition of care (reference Labor (Cervical Ripen, Induct, Augment) (Adult,Obstetrics,Pediatric) CPG).   Outcome: Improving  Pt tolerating labor well. Utilizing breathing and positioning for comfort. Spouse and  present and supportive. Membranes remain intact. Continuous monitoring initiated for decelerations. Plan to continue labor as present.

## 2017-10-06 NOTE — PROGRESS NOTES
Blood pressure 154/74, pulse 88, temperature 98.1  F (36.7  C), temperature source Oral, resp. rate 18, weight 81.2 kg (179 lb), last menstrual period 2016, unknown if currently breastfeeding.  Patient Vitals for the past 24 hrs:   BP Temp Temp src Pulse Resp Weight   10/06/17 0520 154/74 98.1  F (36.7  C) Oral 88 18 81.2 kg (179 lb)     General appearance: uncomfortable with contractions  Involuntary pushing with contractions  CONTACTIONS: moderate and every 2-3 minutes  Pitocin- none,  Antibiotics- none  FETAL HEART TONES: continuous EFM- baseline 130 with moderate variability.  Accelerations- present.  Decelerations- variable once down to 70's with change in position, returning to baseline within 30 seconds  ROM: not ruptured  PELVIC EXAM:9.5, anterior lip 100%/ Mid/ soft/ 0   ASSESSMENT:  ==============  IUP @ 40w4d in transition labor   Uterine fibroids  Fetal Heart Rate Tracing category one  GBS- positive- not treated    PLAN:  ===========  Patient will allow to involuntary push for now, enc. Breathing.   IV started by anesthesia at 0900.     Anticipate 2nd stage soon  Anticipate   TERRANCE Edwards CNM

## 2017-10-06 NOTE — PROGRESS NOTES
Patient arrived to [facility] unit via wheelchair at 1630,with belongings, accompanied by spouse/ significant other, with infant in arms. Received report from KATIE Cristobal  and checked bands. Unit and room orientation started. Call light within arms reach.  ID bands double-checked.  Continue with plan of care.

## 2017-10-06 NOTE — PLAN OF CARE
Data: [facility] at 0[facility].   Reason for maternal/fetal assessment per patient is labor, coontx, possible srom.  Patient is a . Prenatal record reviewed.      Obstetric History       T0      L0     SAB0   TAB0   Ectopic0   Multiple0   Live Births0       # Outcome Date GA Lbr Cliff/2nd Weight Sex Delivery Anes PTL Lv   1 Current               . Medical history:   Past Medical History:   Diagnosis Date     Uncomplicated asthma 0700-8693   . Gestational Age 40w4d. VSS. Fetal movement present. Patient denies cramping, backache, vaginal discharge, pelvic pressure, UTI symptoms, GI problems, vaginal bleeding, edema, headache, visual disturbances, epigastric or URQ pain, abdominal pain. Support persons ORVILLE&DANIEL present.  Action: Verbal consent for EFM. Triage assessment completed. EFM applied, Uterine assessment SOFT, NONTENDER. Fetal assessment: Presumed adequate fetal oxygenation documented (see flow record).   Response:LULY STILL CNM informed of PT ARRIVAL. Plan per provider is SVE/ASSESSMENT. Patient verbalized agreement with plan. Patient to room 456 ambulatory, oriented to room and call light.

## 2017-10-06 NOTE — IP AVS SNAPSHOT
MRN:[id number]                      After Visit Summary   10/6/2017    Sophia Maynard    MRN: [id number]           Thank you!     Thank you for choosing [facility] for your care. Our goal is always to provide you with excellent care. Hearing back from our patients is one way we can continue to improve our services. Please take a few minutes to complete the written survey that you may receive in the mail after you visit with us. Thank you!        Patient Information     Date Of Birth          1980        Designated Caregiver       Most Recent Value    Caregiver    Will someone help with your care after discharge? no      About your hospital stay     You were admitted on:  October 6, 2017 You last received care in the:  [facility]    You were discharged on:  October 8, 2017       Who to Call     For medical emergencies, please call 911.  For non-urgent questions about your medical care, please call your primary care provider or clinic, [phone]          Attending Provider     Provider Specialty    Merlin Henry APRN CNM MIDWIFE    Estela Jurado APRN CNM Midwives       Primary Care Provider Office Phone # Fax #    Alexander Muller [phone]      Your next 10 appointments already scheduled     Oct 09, 2017 11:40 AM CDT   US OB SINGLE FOLLOW UP REPEAT with RDUS1   [facility] [place]    [address] [place] [address] [phone]           Please bring a list of your medicines (including vitamins, minerals and over-the-counter drugs). Also, tell your doctor about any allergies you may have. Wear comfortable clothes and leave your valuables at home.  If you re less than 20 weeks drink four 8-ounce glasses of fluid an hour before your exam. If you need to empty your bladder before your exam, try to release only a little urine. Then, drink another glass of fluid.  You may have up to two family members in the  exam room. If you bring a small child, an adult must be there to care for him or her.  Please call the Imaging Department at your exam site with any questions.            Oct 09, 2017 12:30 PM CDT   ESTABLISHED PRENATAL with TERRANCE Munoz CNM, RD NON STRESS TEST [facility] ([facility]    [address] [place] [address] [phone]              Further instructions from your care team       Postpartum Vaginal Delivery Instructions    Activity       Ask family and friends for help when you need it.    Do not place anything in your vagina for 6 weeks.    You are not restricted on other activities, but take it easy for a few weeks to allow your body to recover from delivery.  You are able to do any activities you feel up to that point.    No driving until you have stopped taking your pain medications (usually two weeks after delivery).     Call your health care provider if you have any of these symptoms:       Increased pain, swelling, redness, or fluid around your stiches from an episiotomy or perineal tear.    A fever above 100.4 F (38 C) with or without chills when placing a thermometer under your tongue.    You soak a sanitary pad with blood within 1 hour, or you see blood clots larger than a golf ball.    Bleeding that lasts more than 6 weeks.    Vaginal discharge that smells bad.    Severe pain, cramping or tenderness in your lower belly area.    A need to urinate more frequently (use the toilet more often), more urgently (use the toilet very quickly), or it burns when you urinate.    Nausea and vomiting.    Redness, swelling or pain around a vein in your leg.    Problems breastfeeding or a red or painful area on your breast.    Chest pain and cough or are gasping for air.    Problems coping with sadness, anxiety, or depression.  If you have any concerns about hurting yourself or the baby, call your provider immediately.     You have  questions or concerns after you return home.     Keep your hands clean:  Always wash your hands before touching your perineal area and stitches.  This helps reduce your risk of infection.  If your hands aren't dirty, you may use an alcohol hand-rub to clean your hands. Keep your nails clean and short.        Pending Results     No orders found from 10/4/2017 to 10/7/2017.            Statement of Approval     Ordered          10/08/17 0933  I have reviewed and agree with all the recommendations and orders detailed in this document.  EFFECTIVE NOW     Approved and electronically signed by:  Estela Jurado APRN CNM             Admission Information     Date & Time Provider Department Dept. Phone    10/6/2017 Estela Jurado APRN CNM [facility] [phone]      Your Vitals Were     Blood Pressure Pulse Temperature Respirations Weight Last Period    133/81 83 98  F (36.7  C) (Oral) 16 81.2 kg (179 lb) 12/22/2016    BMI (Body Mass Index)                   29.29 kg/m2           [employer] Information     [employer] gives you secure access to your electronic health record. If you see a primary care provider, you can also send messages to your care team and make appointments. If you have questions, please call your primary care clinic.  If you do not have a primary care provider, please call [phone] and they will assist you.        Care EveryWhere ID     This is your Care EveryWhere ID. This could be used by other organizations to access your [facility] medical records  [id number]        Equal Access to Services     DICK CASAS : Hadii jose Jiménez, waaxda luqadaha, qaybta kaalmada evan, mady alonzo. So [facility] [phone].    ATENCIÓN: Si habla español, tiene a gomez disposición servicios gratuitos de asistencia lingüística. Llame al [phone].    We comply with applicable federal civil rights laws and [place] laws. We do not discriminate on the basis of race, color,  national origin, age, disability, sex, sexual orientation, or gender identity.               Review of your medicines      UNREVIEWED medicines. Ask your doctor about these medicines        Dose / Directions    DHA OMEGA 3 PO        Refills:  0       PNV PO        Refills:  0       PROBIOTIC ACIDOPHILUS Caps        Refills:  0       VITAMIN D (CHOLECALCIFEROL) PO        Take by mouth daily   Refills:  0                Protect others around you: Learn how to safely use, store and throw away your medicines at www.disposemymeds.org.             Medication List: This is a list of all your medications and when to take them. Check marks below indicate your daily home schedule. Keep this list as a reference.      Medications           Morning Afternoon Evening Bedtime As Needed    DHA OMEGA 3 PO                                PNV PO                                PROBIOTIC ACIDOPHILUS Caps                                VITAMIN D (CHOLECALCIFEROL) PO   Take by mouth daily

## 2017-10-06 NOTE — H&P
ADMISSION NOTE FOR Sophia Maynard on 10/6/2017.    Sophia Maynard is a 37 year old female     woman.      Estimated Date of Delivery: Data Unavailable is calculated from Patient's last menstrual period was 12/22/2016. [facility] at 5:00 AM  in active labor.     Membranes are intact     Labor start time 0300.     PRENATAL COURSE   Prenatal care began at 34 wks gestation as [facility] due to higher risk of PPH with multiple fi broids.   5 prenatal visits at [facility]..  Prenatal vital signs WNL   Prenatal course was   complicated by    Patient Active Problem List    Diagnosis Date Noted     Labor and delivery indication for care or intervention 10/06/2017     Priority: Medium     GBS (group B Streptococcus carrier), +RV culture, currently pregnant 09/12/2017     Priority: Medium     Needs treatment in labor. Clindamycin resistant        Need for Tdap vaccination 08/24/2017     Priority: Medium     AMA (advanced maternal age) multigravida 35+ 08/24/2017     Priority: Medium     FOB: [place]    Patient is transfer from [facility]. Transferring because of risk of PPH due to fibroid. Patient had ultrasound with a perinatologist center and the fibroids are not by the cervix and only the largest are reported, seven total were seen. Discussed getting an ultrasound at 36 weeks for size and cephalic check due to inaccurate measurements from the fibroids. Patient reports she is measuring consistently 3 weeks ahead and prefers not to get ultrasounds unless they are going to change the plan. She will let us know if she agrees to the [facility] ultrasound at 36 weeks. Also on ultrasound unable to see CSP which can be associated with other brain abnormalities. Recommendation for fetal MRI and follow up ultrasound made. Patient declined further ultrasounds.     Patient requesting to have fetoscope instead of dopplers        Fibroid uterus  2017     Priority: Medium     Fibroids x7, only the 3 largest measured, none are located by the cervix.   1. 5.44cm x4.42cm x5.62   2. 10.6cm x7.84cm x9.37cm   3. 3.88cm x3.52cm x4.76cm           HISTORIES   Allergies   Allergen Reactions     Sulfa Drugs Anaphylaxis and Hives     Augmentin GI Disturbance     Cats      Dust Mites      Pollen Extract      Seasonal Allergies      Smoke.       Past Medical History:   Diagnosis Date     Uncomplicated asthma 1226-0307      Past Surgical History:   Procedure Laterality Date     BIOPSY OF SKIN LESION Left 2015    upper left back area      Family History   Problem Relation Age of Onset     Lung Cancer Mother      Coronary Artery Disease Father      Abdominal Aortic Aneurysm Father      CANCER Paternal Grandfather      Coronary Artery Disease Paternal Grandfather      CANCER Paternal Grandmother      CEREBROVASCULAR DISEASE Maternal Grandmother      stroke     Coronary Artery Disease Maternal Grandmother      Coronary Artery Disease Maternal Grandfather      Uterine Cancer No family hx of      Ovarian Cancer No family hx of      Breast Cancer No family hx of       Social History   Substance Use Topics     Smoking status: Never Smoker     Smokeless tobacco: Never Used     Alcohol use Yes      Obstetric History       T0      L0     SAB0   TAB0   Ectopic0   Multiple0   Live Births0       # Outcome Date GA Lbr Cliff/2nd Weight Sex Delivery Anes PTL Lv   1 Current                    LABS:     Lab Results   Component Value Date    ABO O Neg 2017         No results found for: RH   Rhogam indicated and given on    No results found for: RUBELLAABIGG   No results found for: RPR   No results found for: HIV   Lab Results   Component Value Date    HGB 12.2 2017      Lab Results   Component Value Date    HEPBANG Non-Reactive 2017      Lab Results   Component Value Date    GBS Positive 2017      Other significant lab:    None.     ROS   Review Of  Systems  Skin: negative  Eyes: negative  Ears/Nose/Throat: negative  Respiratory: No shortness of breath, dyspnea on exertion, cough, or hemoptysis  Cardiovascular: negative  Gastrointestinal: negative  Genitourinary: negative  Musculoskeletal: negative  Neurologic: negative  Psychiatric: negative  Hematologic/Lymphatic/Immunologic: negative  Endocrine: negative     PHYSICAL EXAM:   Vital signs stable, afebrile and BP is blood pressure   BP Readings from Last 3 Encounters:   10/06/17 154/74   17 110/76   17 98/64      General appearance healthy, alert, active and moderate distress   Heart: RRR without murmur   Lungs: clear to auscultation   Neuro: denies headache and visual disturbances   Psych: Mentation normal and bright   Legs: 2+/2+, no clonus, no edema        Abdomen: gravid, single vertex fetus, non-tender, EFW 8 lbs 3 .   Pt is abhi every 2-3 minutes, lasting 60 seconds and palpates strong     FETAL HEART TONES: baseline 140 .  moderate FHRV variability.  No late decelerations or variable decelerations noted.  Category II fetal heart rate tracing.     PELVIC EXAM: 8/ 100/ Mid/ soft/ 0   BLOODY SHOW:: no   Membranes as listed above.     ASSESSMENT:   IUP @ 40w4d in active labor.  NST reactive.  Category  I  CST negative.  Maternal status is stable.   No diagnosis found.      PLAN:   Admit - see IP orders  Pain medication prn  Prophylactic antibiotic for + GBS status  Anticipate          Merlin Henry CNM

## 2017-10-06 NOTE — PLAN OF CARE
Data: Sophia Maynard transferred to [facility] [id number] via wheelchair at 1630. Baby transferred via parent's arms.  Action: Receiving unit notified of transfer: Yes. Patient and family notified of room change. Report given to Thais Bolton RN at 1700. Belongings sent to receiving unit. Accompanied by Registered Nurse. Oriented patient to surroundings. Call light within reach. ID bands double-checked with receiving RN.  Response: Patient tolerated transfer and is stable.

## 2017-10-06 NOTE — TELEPHONE ENCOUNTER
"  Reason for Disposition    [1] First baby (primipara) AND [2] contractions < 6 minutes apart  AND [3] present 2 hours    Additional Information    Negative: Passed out (i.e., lost consciousness, collapsed and was not responding)    Negative: Shock suspected (e.g., cold/pale/clammy skin, too weak to stand, low BP, rapid pulse)    Negative: Difficult to awaken or acting confused  (e.g., disoriented, slurred speech)    Negative: [1] SEVERE abdominal pain (e.g., excruciating) AND [2] constant AND [3] present > 1 hour    Negative: Severe bleeding (e.g., continuous red blood from vagina, or large blood clots)    Negative: Umbilical cord hanging out of the vagina (shiny, white, curled appearance, \"like telephone cord\")    Negative: Uncontrollable urge to push (i.e., feels like baby is coming out now)    Negative: Can see baby    Negative: Sounds like a life-threatening emergency to the triager    Negative: Pregnant < 37 weeks (i.e., )    Negative: [1] Uncertain delivery date AND [2] possibly pregnant < 37 weeks (i.e., )    Protocols used: PREGNANCY - LABOR-ADULT-  Patient is calling and is having contractions that are 3 minutes apart.  "

## 2017-10-06 NOTE — PROVIDER NOTIFICATION
10/06/17 0645   Intermittent Auscultation   IA Decreases With contractions   less than 15 seconds down to 117. listened through next ctx and no decreases audible. Increases were present.

## 2017-10-06 NOTE — IP AVS SNAPSHOT
[facility]    [address] [facility] [address]    Phone:  [phone]                                       After Visit Summary   10/6/2017    Sophia Maynard    MRN: [id number]           After Visit Summary Signature Page     I have received my discharge instructions, and my questions have been answered. I have discussed any challenges I see with this plan with the nurse or doctor.    ..........................................................................................................................................  Patient/Patient Representative Signature      ..........................................................................................................................................  Patient Representative Print Name and Relationship to Patient    ..................................................               ................................................  Date                                            Time    ..........................................................................................................................................  Reviewed by Signature/Title    ...................................................              ..............................................  Date                                                            Time

## 2017-10-07 LAB — HGB BLD-MCNC: 12.1 G/DL (ref 11.7–15.7)

## 2017-10-07 PROCEDURE — 85018 HEMOGLOBIN: CPT | Performed by: ADVANCED PRACTICE MIDWIFE

## 2017-10-07 PROCEDURE — 12000028 ZZH R&B OB UMMC

## 2017-10-07 PROCEDURE — 85461 HEMOGLOBIN FETAL: CPT | Performed by: ADVANCED PRACTICE MIDWIFE

## 2017-10-07 PROCEDURE — 86900 BLOOD TYPING SEROLOGIC ABO: CPT | Performed by: ADVANCED PRACTICE MIDWIFE

## 2017-10-07 PROCEDURE — 86901 BLOOD TYPING SEROLOGIC RH(D): CPT | Performed by: ADVANCED PRACTICE MIDWIFE

## 2017-10-07 PROCEDURE — 25000132 ZZH RX MED GY IP 250 OP 250 PS 637: Performed by: ADVANCED PRACTICE MIDWIFE

## 2017-10-07 PROCEDURE — 36415 COLL VENOUS BLD VENIPUNCTURE: CPT | Performed by: ADVANCED PRACTICE MIDWIFE

## 2017-10-07 RX ADMIN — ACETAMINOPHEN 650 MG: 325 TABLET, FILM COATED ORAL at 12:09

## 2017-10-07 RX ADMIN — METHYLERGONOVINE MALEATE 200 MCG: 0.2 TABLET ORAL at 07:55

## 2017-10-07 RX ADMIN — ACETAMINOPHEN 650 MG: 325 TABLET, FILM COATED ORAL at 02:16

## 2017-10-07 RX ADMIN — METHYLERGONOVINE MALEATE 200 MCG: 0.2 TABLET ORAL at 14:14

## 2017-10-07 RX ADMIN — IBUPROFEN 800 MG: 800 TABLET ORAL at 14:14

## 2017-10-07 RX ADMIN — METHYLERGONOVINE MALEATE 200 MCG: 0.2 TABLET ORAL at 20:08

## 2017-10-07 RX ADMIN — SENNOSIDES AND DOCUSATE SODIUM 1 TABLET: 8.6; 5 TABLET ORAL at 07:55

## 2017-10-07 RX ADMIN — IBUPROFEN 800 MG: 800 TABLET ORAL at 02:17

## 2017-10-07 RX ADMIN — ACETAMINOPHEN 650 MG: 325 TABLET, FILM COATED ORAL at 20:08

## 2017-10-07 RX ADMIN — IBUPROFEN 800 MG: 800 TABLET ORAL at 20:15

## 2017-10-07 RX ADMIN — ACETAMINOPHEN 650 MG: 325 TABLET, FILM COATED ORAL at 16:01

## 2017-10-07 RX ADMIN — IBUPROFEN 800 MG: 800 TABLET ORAL at 07:55

## 2017-10-07 RX ADMIN — SENNOSIDES AND DOCUSATE SODIUM 1 TABLET: 8.6; 5 TABLET ORAL at 20:08

## 2017-10-07 RX ADMIN — ACETAMINOPHEN 650 MG: 325 TABLET, FILM COATED ORAL at 07:55

## 2017-10-07 RX ADMIN — METHYLERGONOVINE MALEATE 200 MCG: 0.2 TABLET ORAL at 02:16

## 2017-10-07 NOTE — L&D DELIVERY NOTE
Delivery Note Sophia Maynard  IUP at 40 weeks gestation delivered on 10/6/17.     delivery of a viable 6 pounds 9 ounces Female infant.  Apgars of 7 at 1 minute and 9 at 5 minutes.  Labor was spontaneous.  Medications administered  in labor:  Pain Rx none; Antibiotics Yes: PCN;   Perineum: 2nd degree  Placenta-mechanism: spontaneous, intact,  with a 3 vessel cord. IV oxytocin was given. Rectal misoprostol was given.  Estimated Blood Loss was 600cc.  Complications of pregnancy, labor and delivery: multiple uterine fibroids, postpartum hemorrhage, terminal meconium  Birth attendants: Estela Jurado MARIETTA      Delivery Summary    Sophia Maynard MRN# [id number]   Age: 37 year old YOB: 1980     ASSESSMENT & PLAN: delivery of viable infant female        Labor Event Times    Labor onset date:  10/6/17 Onset time:   5:30 AM   Dilation complete date:  10/6/17 Complete time:  10:30 AM   Start pushing date/time:  10/6/2017 1048            Labor Length    1st Stage (hrs):  5 (min):  0   2nd Stage (hrs):  1 (min):  51   3rd Stage (hrs):  0 (min):  13      Labor Events     labor?:  No    steroids:  None   Labor Type:  Spontaneous   Predominate monitoring during 1st stage:  intermittent auscultation      Antibiotics received during labor?:  Yes   Reason for Antibiotics:  GBS   Antibiotics received for GBS:  Penicillin   Antibiotics Given (GBS):  Less than or equal to 4 hours prior to delivery      Rupture date/time:     Rupture type:  Spontaneous rupture of membranes occuring during spontaneous labor or augmentation   Fluid color:  Clear      1:1 continuous labor support provided by?:   Labor partogram used?:  no         Delivery/Placenta Date and Time    Delivery Date:  10/6/17 Delivery Time:  12:21 PM   Placenta Date/Time:  10/6/2017 12:34 PM   Oxytocin given at the time of delivery:  after delivery of baby      Vaginal Counts    Initial count performed by 2 team members:   Two Team  "Members   osmar jurado cnm          Needles Suture [place] [employer]ong[employer]   Initial counts 2 0 5    Added to count 0 1 5    Final counts 2 1 10       Placed during labor Accounted for at the end of labor   NA NA   NA NA   NA NA      Final count performed by 2 team members:   Two Team Members   ARISTEO Jurado CNM         Final count correct?:  Yes         Apgars    Living status:  Living    1 Minute 5 Minute 10 Minute 15 Minute 20 Minute   Skin color: 0  1       Heart rate: 2  2       Reflex irritability: 2  2       Muscle tone: 1  2       Respiratory effort: 2  2       Total: 7  9          Apgars assigned by:  ALL ESPINOZA RN      Cord    Vessels:  3 Vessels Complications:  None   Cord Blood Disposition:  Lab Gases Sent?:  No         [place] Resuscitation    Methods:  None      [place] Care at Delivery:  Stimulated to cry. Slow to pink up. Placed skin to skin with mother. Lung sounds coarse. Continue to monitor.   Output in Delivery Room:  Stool       Measurements    Weight:  6 lb 9.8 oz Length:  1' 9\"   Head circumference:  34.3 cm       Skin to Skin and Feeding Plan    Skin to skin initiation date/time: 10/6/17 1221   Skin to skin with:  Mother   Skin to skin end date/time:     Breastfeeding initiated date/time:  10/6/2017 1245   How do you plan to feed your baby:  Breastfeeding      Labor Events and Shoulder Dystocia    Fetal Tracing Prior to Delivery:  Category 1   Shoulder dystocia present?:  Neg            Delivery (Maternal) (Provider to Complete) (189132)    Episiotomy:  None   Perineal lacerations:  2nd Repaired?:  Yes         Mother's Information  Mother: Bruce Maynardstephanie Parr #[id number]    Start of Mother's Information     IO Blood Loss  10/06/17 0530 - 10/06/17 1928    Mom's I/O Activity            End of Mother's Information  Mother: Sophia Maynard #[id number]            Delivery - Provider to Complete ([id number])    Delivering clinician:  LAMONTE JURADO CNM " Care:  Exclusive CNM care in labor   Attempted Delivery Types (Choose all that apply):  Spontaneous Vaginal Delivery   Delivery Type (Choose the 1 that will go to the Birth History):  Vaginal, Spontaneous Delivery                           Placenta    Delayed Cord Clamping:  Done   Date/Time:  10/6/2017 12:34 PM   Removal:  Spontaneous   Disposition:  Patient possesion      Anesthesia    Method:  None         Presentation and Position    Presentation:  Vertex   Position:  Middle Occiput Anterior                    TERRANCE Edwards CNM

## 2017-10-07 NOTE — PLAN OF CARE
Problem: Postpartum (Vaginal Delivery) (Adult,Obstetrics,Pediatric)  Goal: Signs and Symptoms of Listed Potential Problems Will be Absent, Minimized or Managed (Postpartum)  Signs and symptoms of listed potential problems will be absent, minimized or managed by discharge/transition of care (reference Postpartum (Vaginal Delivery) (Adult,Obstetrics,Pediatric) CPG).   Outcome: Improving  Data: Vital signs within normal limits. Postpartum checks within normal limits - see flow record. Anterior uterine fibroid palpable at fundus. Patient eating and drinking normally. Patient able to empty bladder independently and is up ambulating. No apparent signs of infection. Laceration healing well. Labia swelling improving. Patient performing self cares and is able to care for infant.  Action: Patient medicated during the shift for pain and cramping. See MAR. Patient reassessed within 1 hour after each medication and pain was improved - patient stated she was comfortable. Patient education done. See flow record.  Response: Positive attachment behaviors observed with infant. Support persons present.   Plan: Anticipate discharge after 48 hours postpartum.

## 2017-10-07 NOTE — PROGRESS NOTES
Pt and  oriented to room and unit routines.  Welcome folder reviewed, EDS and birth certificate at bedside.  VSS and postpartum assessments WDL, with exception of uterine fibroids.  Up ad john with stand by assist to bathroom then independently after steady gait observed.  Independent with cares.  Bonding well with infant.  Breastfeeding on cue with assist for positioning and latching.  Pain managed with tylenol and ibuprofen per MAR and ice packs, tucks and hot packs.  PIV in left hand saline locked after 2nd bag of pitocin.  , North present and supportive.  Will continue with postpartum cares and education per plan of care.

## 2017-10-07 NOTE — PLAN OF CARE
Problem: Patient Care Overview  Goal: Plan of Care/Patient Progress Review  Outcome: Improving  VSS and postpartum assessments WDL, with exception of uterine fibroids.  Up ad john with steady gait.  Independent with cares.  Bonding well with infant.  Breastfeeding on cue with minimal assist for positioning and latching.  Pain managed with tylenol and ibuprofen per MAR and ice packs, tucks and hot packs.  PIV in left hand saline locked after 2nd bag of pitocin.  , North present and supportive.  EDS and birth certificate at bedside.  Resting between cares.  Will continue with postpartum cares and education per plan of care

## 2017-10-07 NOTE — PROGRESS NOTES
Post Partum Note    Sophia Maynard      MRN#: [id number]  Age: 37 year old      YOB: 1980      Post-partum day #1    SIGNIFICANT PROBLEMS:  Patient Active Problem List    Diagnosis Date Noted     Labor and delivery indication for care or intervention 10/06/2017     Priority: Medium     GBS (group B Streptococcus carrier), +RV culture, currently pregnant 2017     Priority: Medium     Needs treatment in labor. Clindamycin resistant        Need for Tdap vaccination 2017     Priority: Medium     AMA (advanced maternal age) multigravida 35+ 2017     Priority: Medium     FOB: [place]    Patient is transfer from [facility]. Transferring because of risk of PPH due to fibroid. Patient had ultrasound with a perinatologist center and the fibroids are not by the cervix and only the largest are reported, seven total were seen. Discussed getting an ultrasound at 36 weeks for size and cephalic check due to inaccurate measurements from the fibroids. Patient reports she is measuring consistently 3 weeks ahead and prefers not to get ultrasounds unless they are going to change the plan. She will let us know if she agrees to the [facility] ultrasound at 36 weeks. Also on ultrasound unable to see CSP which can be associated with other brain abnormalities. Recommendation for fetal MRI and follow up ultrasound made. Patient declined further ultrasounds.     Patient requesting to have fetoscope instead of dopplers        Fibroid uterus 2017     Priority: Medium     Fibroids x7, only the 3 largest measured, none are located by the cervix.   1. 5.44cm x4.42cm x5.62   2. 10.6cm x7.84cm x9.37cm   3. 3.88cm x3.52cm x4.76cm          INTERVAL HISTORY:  /69  Pulse 88  Temp 98  F (36.7  C) (Oral)  Resp 16  Wt 81.2 kg (179 lb)  LMP 2016  Breastfeeding? Unknown  BMI 29.29 kg/m2    Pt stable, baby is rooming in  Breast feeding status:initiated  Complications since 2  hours post delivery: None  Patient is tolerating acitivity well Voiding without difficulty, cramping is minimal and is relieved by Ibuprophen, lochia is decreasing and patient denies clots.  Perineal pain is is minimal and is relieved by Ibuprophen.  The perineum is intact    Normal postpartum exam     Postpartum hemoglobin   Hemoglobin   Date Value Ref Range Status   10/07/2017 12.1 11.7 - 15.7 g/dL Final     Blood type   Lab Results   Component Value Date    ABO O 10/07/2017       Lab Results   Component Value Date    RH Neg 10/07/2017     Rubella immune    ASSESSMENT/PLAN:  Stable Post-partum day #1  Complications:none  Plan d/c home tomorrow  RTC 6 weeks  Teaching done: D/C Instructions: Nutrition/Activity, Birth Control Options, Warning Signs/When to Call: Excessive Bleeding, Infection, PP Depression, RTC Clinic for PP Appointment and PNV    Postpartum warning s/s reviewed, including bleeding/clots, fever, mastitis, or depression    Birthcontrol planned:Hasn't considered at this point but will think about it and discuss at 6 week visit.  Current Discharge Medication List      CONTINUE these medications which have NOT CHANGED    Details   Docosahexaenoic Acid (DHA OMEGA 3 PO)       Lactobacillus (PROBIOTIC ACIDOPHILUS) CAPS       Prenatal Vit w/Df-Atcalyfkc-CW (PNV PO)       VITAMIN D, CHOLECALCIFEROL, PO Take by mouth daily         Plans to  Ibuprofen, tylenol and stool softener from pharmacy on her way home.   Sandra Masterson CNM

## 2017-10-08 VITALS
DIASTOLIC BLOOD PRESSURE: 81 MMHG | RESPIRATION RATE: 16 BRPM | BODY MASS INDEX: 29.29 KG/M2 | WEIGHT: 179 LBS | TEMPERATURE: 98 F | HEART RATE: 83 BPM | SYSTOLIC BLOOD PRESSURE: 133 MMHG

## 2017-10-08 LAB
ABO + RH BLD: NORMAL
ABO + RH BLD: NORMAL
BLOOD BANK CMNT PATIENT-IMP: NORMAL
DATE RH IMM GL GVN: NORMAL
FETAL CELL SCN BLD QL ROSETTE: NORMAL
RH IG VIALS RECOM PATIENT: NORMAL

## 2017-10-08 PROCEDURE — 25000132 ZZH RX MED GY IP 250 OP 250 PS 637: Performed by: ADVANCED PRACTICE MIDWIFE

## 2017-10-08 PROCEDURE — 25000128 H RX IP 250 OP 636: Performed by: ADVANCED PRACTICE MIDWIFE

## 2017-10-08 RX ADMIN — METHYLERGONOVINE MALEATE 200 MCG: 0.2 TABLET ORAL at 08:54

## 2017-10-08 RX ADMIN — SENNOSIDES AND DOCUSATE SODIUM 1 TABLET: 8.6; 5 TABLET ORAL at 08:54

## 2017-10-08 RX ADMIN — HUMAN RHO(D) IMMUNE GLOBULIN 300 MCG: 300 INJECTION, SOLUTION INTRAMUSCULAR at 13:13

## 2017-10-08 RX ADMIN — IBUPROFEN 800 MG: 800 TABLET ORAL at 02:09

## 2017-10-08 RX ADMIN — METHYLERGONOVINE MALEATE 200 MCG: 0.2 TABLET ORAL at 02:09

## 2017-10-08 RX ADMIN — ACETAMINOPHEN 650 MG: 325 TABLET, FILM COATED ORAL at 08:54

## 2017-10-08 RX ADMIN — IBUPROFEN 800 MG: 800 TABLET ORAL at 08:57

## 2017-10-08 RX ADMIN — ACETAMINOPHEN 650 MG: 325 TABLET, FILM COATED ORAL at 02:25

## 2017-10-08 NOTE — PLAN OF CARE
Problem: Patient Care Overview  Goal: Plan of Care/Patient Progress Review  Outcome: Improving  Data: Vital signs within normal limits. Postpartum checks within normal limits - see flow record. Patient eating and drinking normally. Patient able to empty bladder independently and is up ambulating. Patient performing self cares and is able to care for infant.  Action: Pain has been adequately managed with Tylenol and Ibuprofen. Pt also using hot packs for cramping.   Response: Positive attachment behaviors observed with infant. Support person, spouse: [place], present.   Plan: Continue with the plan of care.

## 2017-10-08 NOTE — PLAN OF CARE
Problem: Patient Care Overview  Goal: Plan of Care/Patient Progress Review  Outcome: Improving  Data: Vital signs within normal limits. Postpartum checks within normal limits - see flow record, pt has fibroids that can be palpated during fundal assessment. Patient eating and drinking normally. Patient able to empty bladder independently and is up ambulating. No apparent signs of infection. Patient performing self cares and is able to care for infant. Breastfeeding independently, will check the latch during next feeding.   Action: Pain has been adequately managed with Tylenol and Ibuprofen.   Response: Positive attachment behaviors observed with infant. Support person, spouse: [place], present.   Plan: Continue with the plan of care.

## 2017-10-08 NOTE — PROGRESS NOTES
Post Partum Note  SIGNIFICANT PROBLEMS:  Patient Active Problem List    Diagnosis Date Noted     Labor and delivery indication for care or intervention 10/06/2017     Priority: Medium     GBS (group B Streptococcus carrier), +RV culture, currently pregnant 09/12/2017     Priority: Medium     Needs treatment in labor. Clindamycin resistant        Need for Tdap vaccination 08/24/2017     Priority: Medium     AMA (advanced maternal age) multigravida 35+ 08/24/2017     Priority: Medium     FOB: [place]    Patient is transfer from [facility] [place]. Transferring because of risk of PPH due to fibroid. Patient had ultrasound with a perinatologist center and the fibroids are not by the cervix and only the largest are reported, seven total were seen. Discussed getting an ultrasound at 36 weeks for size and cephalic check due to inaccurate measurements from the fibroids. Patient reports she is measuring consistently 3 weeks ahead and prefers not to get ultrasounds unless they are going to change the plan. She will let us know if she agrees to the [facility] ultrasound at 36 weeks. Also on ultrasound unable to see CSP which can be associated with other brain abnormalities. Recommendation for fetal MRI and follow up ultrasound made. Patient declined further ultrasounds.     Patient requesting to have fetoscope instead of dopplers        Fibroid uterus 08/24/2017     Priority: Medium     Fibroids x7, only the 3 largest measured, none are located by the cervix.   1. 5.44cm x4.42cm x5.62   2. 10.6cm x7.84cm x9.37cm   3. 3.88cm x3.52cm x4.76cm          INTERVAL HISTORY:  /79  Pulse 74  Temp 97.9  F (36.6  C) (Oral)  Resp 16  Wt 81.2 kg (179 lb)  LMP 12/22/2016  Breastfeeding? Unknown  BMI 29.29 kg/m2  Pt stable, baby is rooming in.   Breast feeding status:initiated and well established  Complications since 2 hours post delivery: None  Patient is tolerating activity well, Voiding without difficulty, cramping is  relieved by Ibuprophen, lochia is decreasing and patient denies clots.  Perineal pain is is relieved by Ibuprophen.  The perineum laceration is well approximated    Postpartum hemoglobin   Hemoglobin   Date Value Ref Range Status   10/07/2017 12.1 11.7 - 15.7 g/dL Final     Blood type   Lab Results   Component Value Date    ABO O 10/07/2017       Lab Results   Component Value Date    RH Neg 10/07/2017     Rubella status   Lab Results   Component Value Date    RUQIGG immune 03/23/2017     Rubella: immune  History of depression: none . Postpartum depression warning signs reviewed.    ASSESSMENT/PLAN:  Normal postpartum exam , Stable Post-partum day #2  Complications:none  Plan d/c home today. Home Visit Ordered- Yes: 1st time breastfeeder  RTC 6 weeks  Postpartum warning s/s reviewed, including bleeding/clots, fever, mastitis, or depression  Kegels/ crunches  Continue prenatal vitamins  Birthcontrol planned:Undecided. Fertility and contraception options reviewed.  Pt has no idea what tod do with birth control will talk with   Current Discharge Medication List      CONTINUE these medications which have NOT CHANGED    Details   Docosahexaenoic Acid (DHA OMEGA 3 PO)       Lactobacillus (PROBIOTIC ACIDOPHILUS) CAPS       Prenatal Vit w/Gr-Rgwzhkxhr-BW (PNV PO)       VITAMIN D, CHOLECALCIFEROL, PO Take by mouth daily         All teaching done, breastfeeding well, will consider birth control with  and discuss at 6 week postpartum visit.   TERRANCE Edwards CNM

## 2017-10-08 NOTE — PLAN OF CARE
Problem: Postpartum (Vaginal Delivery) (Adult,Obstetrics,Pediatric)  Goal: Signs and Symptoms of Listed Potential Problems Will be Absent, Minimized or Managed (Postpartum)  Signs and symptoms of listed potential problems will be absent, minimized or managed by discharge/transition of care (reference Postpartum (Vaginal Delivery) (Adult,Obstetrics,Pediatric) CPG).   Patient taking care of self and infant independently. Breastfeeding independently, good latch observed. Postpartum checks wnl.  Patient not sent with electric breast pump- would like to check with insurance prior to getting a pump.  Pt educated on discharge instructions and given written handout, verbalized understanding of instructions. Discharged without medications-prefers to use home medications and declines flu shot.  Patient discharged via wheel chair with infant at 1409.

## 2017-11-16 ENCOUNTER — PRENATAL OFFICE VISIT (OUTPATIENT)
Dept: MIDWIFE SERVICES | Facility: CLINIC | Age: 37
End: 2017-11-16
Payer: COMMERCIAL

## 2017-11-16 VITALS
OXYGEN SATURATION: 98 % | TEMPERATURE: 97 F | HEART RATE: 76 BPM | DIASTOLIC BLOOD PRESSURE: 76 MMHG | WEIGHT: 155 LBS | SYSTOLIC BLOOD PRESSURE: 112 MMHG | BODY MASS INDEX: 25.36 KG/M2

## 2017-11-16 PROCEDURE — 99207 ZZC POST PARTUM EXAM: CPT | Performed by: ADVANCED PRACTICE MIDWIFE

## 2017-11-21 PROBLEM — O99.820 GBS (GROUP B STREPTOCOCCUS CARRIER), +RV CULTURE, CURRENTLY PREGNANT: Status: RESOLVED | Noted: 2017-09-12 | Resolved: 2017-11-21

## 2017-11-21 NOTE — PROGRESS NOTES
SUBJECTIVE: Sophia is here for a 6-week postpartum checkup.    Delivery date was 10/6/17. She had a  of a viable girl,  with Breast fed complications.  Since delivery, she has been breast feeding.  She has no signs of infection, bleeding or other complications.    We discussed contraception and she has chosen none.  She  has not had intercourse since delivery and complains of trace discomfort. Patient screened for postpartum depression and complaints are NEGATIVE.    Screening has also been completed for intimate partner violence.    Discussed contraception.  Due to risk of infertility due to fibroids will use fertility awareness.  Recommend 1 yr between pregnancies but due to potential    EXAM:  GENERAL healthy, alert and no distress  ABDOMEN:  Due to fibroids the uterus is still enlarged to 20 week size.  No dysfunctional bleeding.    GYN PELVIC: normal external genitalia, normal groin lymphatics, normal urethral meatus and normal vaginal mucosa  PSYCH: negative for anxiety and depression    ASSESSMENT:   Normal postpartum exam after .    PLAN:  Return as needed or at time interval for next routine pap, pelvic, or breast exam.  Encourage Kegals and abdominal exercise.  Continue a multi vitamin supplement, especially if breastfeeding.  Pap smear was not obtained.  Post partum Hgb was not obtained.  Merlin Henry APRN, CNM

## 2018-07-20 ENCOUNTER — OFFICE VISIT (OUTPATIENT)
Dept: MIDWIFE SERVICES | Facility: CLINIC | Age: 38
End: 2018-07-20
Payer: COMMERCIAL

## 2018-07-20 VITALS
SYSTOLIC BLOOD PRESSURE: 107 MMHG | HEIGHT: 66 IN | HEART RATE: 94 BPM | TEMPERATURE: 98.6 F | BODY MASS INDEX: 21.08 KG/M2 | WEIGHT: 131.2 LBS | OXYGEN SATURATION: 99 % | DIASTOLIC BLOOD PRESSURE: 71 MMHG

## 2018-07-20 DIAGNOSIS — Z00.00 ROUTINE GENERAL MEDICAL EXAMINATION AT A HEALTH CARE FACILITY: Primary | ICD-10-CM

## 2018-07-20 DIAGNOSIS — D25.2 SUBSEROUS LEIOMYOMA OF UTERUS: ICD-10-CM

## 2018-07-20 PROBLEM — O09.529 AMA (ADVANCED MATERNAL AGE) MULTIGRAVIDA 35+: Status: RESOLVED | Noted: 2017-08-24 | Resolved: 2018-07-20

## 2018-07-20 PROCEDURE — G0476 HPV COMBO ASSAY CA SCREEN: HCPCS | Performed by: ADVANCED PRACTICE MIDWIFE

## 2018-07-20 PROCEDURE — G0145 SCR C/V CYTO,THINLAYER,RESCR: HCPCS | Performed by: ADVANCED PRACTICE MIDWIFE

## 2018-07-20 PROCEDURE — 99395 PREV VISIT EST AGE 18-39: CPT | Performed by: ADVANCED PRACTICE MIDWIFE

## 2018-07-20 NOTE — MR AVS SNAPSHOT
"              After Visit Summary   7/20/2018    Sophia Maynard    MRN: [id number]           Patient Information     Date Of Birth          1980        Visit Information        Provider Department      7/20/2018 9:30 AM Merlin Henry APRN CNM [facility]        Today's Diagnoses     Routine general medical examination at a health care facility    -  1    Subserous leiomyoma of uterus           Follow-ups after your visit        Who to contact     If you have questions or need follow up information about today's clinic visit or your schedule please contact [facility] directly at [phone].  Normal or non-critical lab and imaging results will be communicated to you by [employer], letter or phone within 4 business days after the clinic has received the results. If you do not hear from us within 7 days, please contact the clinic through [employer] or phone. If you have a critical or abnormal lab result, we will notify you by phone as soon as possible.  Submit refill requests through [employer] or call your pharmacy and they will forward the refill request to us. Please allow 3 business days for your refill to be completed.          Additional Information About Your Visit        MyChart Information     [employer] gives you secure access to your electronic health record. If you see a primary care provider, you can also send messages to your care team and make appointments. If you have questions, please call your primary care clinic.  If you do not have a primary care provider, please call [phone] and they will assist you.        Care EveryWhere ID     This is your Care EveryWhere ID. This could be used by other organizations to access your [facility] medical records  [id number]        Your Vitals Were     Pulse Temperature Height Pulse Oximetry Breastfeeding? BMI (Body Mass Index)    94 98.6  F (37  C) (Oral) 5' 5.75\" (1.67 m) 99% Yes 21.34 kg/m2       Blood Pressure from Last 3 " Encounters:   07/20/18 107/71   11/16/17 112/76   10/08/17 133/81    Weight from Last 3 Encounters:   07/20/18 131 lb 3.2 oz (59.5 kg)   11/16/17 155 lb (70.3 kg)   10/06/17 179 lb (81.2 kg)              We Performed the Following     HPV High Risk Types DNA Cervical     Pap imaged thin layer screen with HPV - recommended age 30 - 65 years (select HPV order below)          Today's Medication Changes          These changes are accurate as of 7/20/18 11:59 PM.  If you have any questions, ask your nurse or doctor.               Stop taking these medicines if you haven't already. Please contact your care team if you have questions.     order for DME   Stopped by:  Merlin Henry APRN CNM                    Primary Care Provider Office Phone # Fax #    Alexander Muller [phone]       [address] [address]        Equal Access to Services     ANJALI CASAS : Hadii jose amato hadasho Soomaali, waaxda luqadaha, qaybta kaalmada adeegyada, mady juarez . So [facility] [phone].    ATENCIÓN: Si habla español, tiene a gomez disposición servicios gratuitos de asistencia lingüística. Llame al [phone].    We comply with applicable federal civil rights laws and [place] laws. We do not discriminate on the basis of race, color, national origin, age, disability, sex, sexual orientation, or gender identity.            Thank you!     Thank you for choosing [facility]  for your care. Our goal is always to provide you with excellent care. Hearing back from our patients is one way we can continue to improve our services. Please take a few minutes to complete the written survey that you may receive in the mail after your visit with us. Thank you!             Your Updated Medication List - Protect others around you: Learn how to safely use, store and throw away your medicines at www.disposemymeds.org.          This list is accurate as of 7/20/18 11:59 PM.   Always use your most recent med list.                   Brand Name Dispense Instructions for use Diagnosis    VITAMIN D (CHOLECALCIFEROL) PO      Take 6,000 Units by mouth daily

## 2018-07-20 NOTE — PROGRESS NOTES
"Chief Complaint   Patient presents with     Physical       Initial /71 (BP Location: Left arm, Patient Position: Sitting, Cuff Size: Adult Regular)  Pulse 94  Temp 98.6  F (37  C) (Oral)  Ht 5' 5.75\" (1.67 m)  Wt 131 lb 3.2 oz (59.5 kg)  SpO2 99%  Breastfeeding? Yes  BMI 21.34 kg/m2 Estimated body mass index is 21.34 kg/(m^2) as calculated from the following:    Height as of this encounter: 5' 5.75\" (1.67 m).    Weight as of this encounter: 131 lb 3.2 oz (59.5 kg).  BP completed using cuff size: regular        The following HM Due: pap smear      The following patient reported/Care Every where data was sent to:  P ABSTRACT QUALITY INITIATIVES [90416]  n/a      patient has appointment for today and orders have been placed      Sophia is a 37 year old  female who presents for annual exam.     Menses are absent .  No LMP recorded. Patient is not currently having periods (Reason: Breast Feeding).. Using none for contraception.  She is currently considering pregnancy.  Besides routine health maintenance,  she would like to discuss weight loss and fibroids.  GYNECOLOGIC HISTORY:  Menarche: [age]  Age at first intercourse: 19 Number of lifetime partners: less than 6  Sophia is sexually active with male partner(s) and is currently in monogamous relationship with .    History sexually transmitted infections:Chlamydia  STI testing offered?  Declined  CHRISTOFER exposure: No  History of abnormal Pap smear: YES - updated in Problem List and Health Maintenance accordingly, early .  Family history of breast CA: No  Family history of uterine/ovarian CA: No    Family history of colon CA: No    HEALTH MAINTENANCE:  Cholesterol: (No results found for: CHOL History of abnormal lipids: No  Mammo: n/a . History of abnormal Mammo: Not applicable.  Regular Self Breast Exams: Yes  Calcium/Vitamin D intake: source:  dairy, green leafy veggies Adequate? Yes  TSH: (No results found for: TSH )  Pap; (No results " found for: PAP )    HISTORY:  Obstetric History       T1      L1     SAB0   TAB0   Ectopic0   Multiple0   Live Births1       # Outcome Date GA Lbr Cliff/2nd Weight Sex Delivery Anes PTL Lv   1 Term 10/06/17 40w4d 05:00 / 01:51 6 lb 9.8 oz (3 kg) F Vag-Spont None N RIGOBERTO      Name: Halima Oseguera      Complications: GBS      Apgar1:  7                Apgar5: 9        Past Medical History:   Diagnosis Date     Uncomplicated asthma [id number]     Past Surgical History:   Procedure Laterality Date     BIOPSY OF SKIN LESION Left 2015    upper left back area     Family History   Problem Relation Age of Onset     Lung Cancer Mother      Coronary Artery Disease Father      Abdominal Aortic Aneurysm Father      Cancer Paternal Grandfather      Coronary Artery Disease Paternal Grandfather      Cancer Paternal Grandmother      Cerebrovascular Disease Maternal Grandmother      stroke     Coronary Artery Disease Maternal Grandmother      Coronary Artery Disease Maternal Grandfather      Uterine Cancer No family hx of      Ovarian Cancer No family hx of      Breast Cancer No family hx of      Social History     Social History     Marital status:      Spouse name: N/A     Number of children: N/A     Years of education: N/A     Social History Main Topics     Smoking status: Never Smoker     Smokeless tobacco: Never Used     Alcohol use Yes      Comment: very rare, only about 1-2 per month     Drug use: No     Sexual activity: Yes     Partners: Male     Birth control/ protection: None     Other Topics Concern     None     Social History Narrative       Current Outpatient Prescriptions:      VITAMIN D, CHOLECALCIFEROL, PO, Take 6,000 Units by mouth daily, Disp: , Rfl:      Allergies   Allergen Reactions     Sulfa Drugs Anaphylaxis and Hives     Augmentin GI Disturbance     Cats      Dust Mites      Pollen Extract      Seasonal Allergies      Smoke.        Past medical, surgical, social and family history were reviewed and  "updated in EPIC.    ROS:   C:     NEGATIVE for fever, chills, change in weight  I:       NEGATIVE for worrisome rashes, moles or lesions  E:     NEGATIVE for vision changes or irritation  E/M: NEGATIVE for ear, mouth and throat problems  R:     NEGATIVE for significant cough or SOB  CV:   NEGATIVE for chest pain, palpitations or peripheral edema  GI:     NEGATIVE for nausea, abdominal pain, heartburn, or change in bowel habits  :   NEGATIVE for frequency, dysuria, hematuria, vaginal discharge, or irregular bleeding  M:     NEGATIVE for significant arthralgias or myalgia  N:      NEGATIVE for weakness, dizziness or paresthesias  E:      NEGATIVE for temperature intolerance, skin/hair changes  P:      NEGATIVE for changes in mood or affect.    EXAM:  /71 (BP Location: Left arm, Patient Position: Sitting, Cuff Size: Adult Regular)  Pulse 94  Temp 98.6  F (37  C) (Oral)  Ht 5' 5.75\" (1.67 m)  Wt 131 lb 3.2 oz (59.5 kg)  SpO2 99%  Breastfeeding? Yes  BMI 21.34 kg/m2   BMI: Body mass index is 21.34 kg/(m^2).  Constitutional: healthy, alert and no distress  Head: Normocephalic. No masses, lesions, tenderness or abnormalities  Neck: Neck supple. Trachea midline. No adenopathy. Thyroid symmetric, normal size.   Cardiovascular: RRR.   Respiratory: Negative.   Breast: Deferred  Gastrointestinal: Abdomen soft, non-tender, non-distended. No masses, organomegaly.  :  Vulva:  No external lesions, normal female hair distribution, no inguinal adenopathy.    Urethra:  Midline, non-tender, well supported, no discharge  Vagina:  Moist, pink, no abnormal discharge, no lesions  Uterus:  Enlarged size due to know fibroids at 16 week size.  non-tender.  Ovaries:  No masses appreciated, non-tender, mobile  Rectal Exam: deferred  Musculoskeletal: extremities normal  Skin: no suspicious lesions or rashes  Psychiatric: Affect appropriate, cooperative,mentation appears normal.     COUNSELING:   Reviewed preventive health " counseling, as reflected in patient instructions       Regular exercise       Contraception   reports that she has never smoked. She has never used smokeless tobacco.    Body mass index is 21.34 kg/(m^2).    FRAX Risk Assessment    ASSESSMENT:  37 year old female with satisfactory annual exam  (Z00.00) Routine general medical examination at a health care facility  (primary encounter diagnosis)  Comment:   Plan: HPV High Risk Types DNA Cervical, Pap imaged         thin layer screen with HPV - recommended age 30        - 65 years (select HPV order below)      (D25.2) Subserous leiomyoma of uterus  Comment: 16 week size uterus noted on exam  Plan: Monitor for dysfunctional bleeding.  Fertility still desired for future

## 2018-07-24 LAB
COPATH REPORT: NORMAL
PAP: NORMAL

## 2018-07-25 LAB
FINAL DIAGNOSIS: NORMAL
HPV HR 12 DNA CVX QL NAA+PROBE: NEGATIVE
HPV16 DNA SPEC QL NAA+PROBE: NEGATIVE
HPV18 DNA SPEC QL NAA+PROBE: NEGATIVE
SPECIMEN DESCRIPTION: NORMAL
SPECIMEN SOURCE CVX/VAG CYTO: NORMAL

## 2019-11-09 ENCOUNTER — HEALTH MAINTENANCE LETTER (OUTPATIENT)
Age: 39
End: 2019-11-09

## 2020-12-06 ENCOUNTER — HEALTH MAINTENANCE LETTER (OUTPATIENT)
Age: 40
End: 2020-12-06

## 2021-09-26 ENCOUNTER — HEALTH MAINTENANCE LETTER (OUTPATIENT)
Age: 41
End: 2021-09-26

## 2022-01-16 ENCOUNTER — HEALTH MAINTENANCE LETTER (OUTPATIENT)
Age: 42
End: 2022-01-16

## 2022-03-28 ENCOUNTER — APPOINTMENT (OUTPATIENT)
Dept: URBAN - METROPOLITAN AREA CLINIC 260 | Age: 42
Setting detail: DERMATOLOGY
End: 2022-03-29

## 2022-03-28 VITALS — RESPIRATION RATE: 16 BRPM | WEIGHT: 140 LBS | HEIGHT: 65 IN

## 2022-03-28 DIAGNOSIS — D49.2 NEOPLASM OF UNSPECIFIED BEHAVIOR OF BONE, SOFT TISSUE, AND SKIN: ICD-10-CM

## 2022-03-28 DIAGNOSIS — Z87.2 PERSONAL HISTORY OF DISEASES OF THE SKIN AND SUBCUTANEOUS TISSUE: ICD-10-CM

## 2022-03-28 PROCEDURE — OTHER BIOPSY BY SHAVE METHOD: OTHER

## 2022-03-28 PROCEDURE — OTHER MIPS QUALITY: OTHER

## 2022-03-28 PROCEDURE — 11102 TANGNTL BX SKIN SINGLE LES: CPT

## 2022-03-28 PROCEDURE — OTHER EDUCATIONAL RESOURCES PROVIDED: OTHER

## 2022-03-28 PROCEDURE — 99202 OFFICE O/P NEW SF 15 MIN: CPT | Mod: 25

## 2022-03-28 PROCEDURE — OTHER COUNSELING: OTHER

## 2022-03-28 ASSESSMENT — LOCATION SIMPLE DESCRIPTION DERM
LOCATION SIMPLE: CHEST
LOCATION SIMPLE: RIGHT UPPER BACK

## 2022-03-28 ASSESSMENT — LOCATION DETAILED DESCRIPTION DERM
LOCATION DETAILED: RIGHT SUPERIOR MEDIAL UPPER BACK
LOCATION DETAILED: LEFT MEDIAL SUPERIOR CHEST

## 2022-03-28 ASSESSMENT — LOCATION ZONE DERM: LOCATION ZONE: TRUNK

## 2022-03-28 NOTE — PROCEDURE: MIPS QUALITY
Quality 431: Preventive Care And Screening: Unhealthy Alcohol Use - Screening: Patient not identified as an unhealthy alcohol user when screened for unhealthy alcohol use using a systematic screening method
Quality 226: Preventive Care And Screening: Tobacco Use: Screening And Cessation Intervention: Patient screened for tobacco use and is an ex/non-smoker
Quality 130: Documentation Of Current Medications In The Medical Record: Current Medications Documented
Detail Level: Detailed
Quality 110: Preventive Care And Screening: Influenza Immunization: Influenza immunization was not ordered or administered, reason not given

## 2022-10-03 ENCOUNTER — APPOINTMENT (OUTPATIENT)
Dept: URBAN - METROPOLITAN AREA CLINIC 260 | Age: 42
Setting detail: DERMATOLOGY
End: 2022-10-04

## 2022-10-03 VITALS — HEIGHT: 64 IN | WEIGHT: 142 LBS

## 2022-10-03 DIAGNOSIS — L82.1 OTHER SEBORRHEIC KERATOSIS: ICD-10-CM

## 2022-10-03 DIAGNOSIS — Z87.2 PERSONAL HISTORY OF DISEASES OF THE SKIN AND SUBCUTANEOUS TISSUE: ICD-10-CM

## 2022-10-03 DIAGNOSIS — Z71.89 OTHER SPECIFIED COUNSELING: ICD-10-CM

## 2022-10-03 DIAGNOSIS — D18.0 HEMANGIOMA: ICD-10-CM

## 2022-10-03 DIAGNOSIS — L81.4 OTHER MELANIN HYPERPIGMENTATION: ICD-10-CM

## 2022-10-03 DIAGNOSIS — D22 MELANOCYTIC NEVI: ICD-10-CM

## 2022-10-03 PROBLEM — D18.01 HEMANGIOMA OF SKIN AND SUBCUTANEOUS TISSUE: Status: ACTIVE | Noted: 2022-10-03

## 2022-10-03 PROBLEM — D22.5 MELANOCYTIC NEVI OF TRUNK: Status: ACTIVE | Noted: 2022-10-03

## 2022-10-03 PROCEDURE — OTHER COUNSELING: OTHER

## 2022-10-03 PROCEDURE — 99213 OFFICE O/P EST LOW 20 MIN: CPT

## 2022-10-03 PROCEDURE — OTHER MIPS QUALITY: OTHER

## 2022-10-03 ASSESSMENT — LOCATION ZONE DERM
LOCATION ZONE: ARM
LOCATION ZONE: LEG
LOCATION ZONE: TRUNK

## 2022-10-03 ASSESSMENT — LOCATION DETAILED DESCRIPTION DERM
LOCATION DETAILED: RIGHT POSTERIOR SHOULDER
LOCATION DETAILED: LEFT POSTERIOR SHOULDER
LOCATION DETAILED: RIGHT SUPERIOR MEDIAL UPPER BACK
LOCATION DETAILED: RIGHT PROXIMAL PRETIBIAL REGION
LOCATION DETAILED: EPIGASTRIC SKIN
LOCATION DETAILED: INFERIOR THORACIC SPINE

## 2022-10-03 ASSESSMENT — LOCATION SIMPLE DESCRIPTION DERM
LOCATION SIMPLE: RIGHT UPPER BACK
LOCATION SIMPLE: ABDOMEN
LOCATION SIMPLE: LEFT SHOULDER
LOCATION SIMPLE: UPPER BACK
LOCATION SIMPLE: RIGHT SHOULDER
LOCATION SIMPLE: RIGHT PRETIBIAL REGION

## 2022-11-02 NOTE — PROGRESS NOTES
37w3d  Patient feeling well. Positive fetal movement. Denies water leaking, vaginal bleeding, decreased fetal movement, contraction pain, or headaches.   Doing well. No concerns today. Has her birth plan which we reviewed. Planning on oral vitamin K but knows it is important. Discussed recommendations that injection works better than oral vitamin K. Also recommended she make a meet and greet visit with her preference of pediatrician to make sure she gets more information on vitamin K and to make sure they align with her views on .   BSUS showed cephalic position and back on right side.   Discussed positive GBS status.   Danger signs reviewed, pre-eclampsia signs and symptoms discussed.   Knows when to call triage and has phone numbers.   Follow up in 1 week.   Flaquita Bolden CNM    INTERNAL MEDICINE RESIDENCY DISCHARGE SUMMARY     Pako Coffman   68 y o  female  MRN: [id number]  Room/Bed: /-01     [address]   Encounter: [id number]    Principal Problem:    SIRS (systemic inflammatory response syndrome) ([facility] )  Active Problems:    Malignant tumor of right kidney parenchyma (HCC)    Permanent atrial fibrillation (HCC)    Unsteady gait    Dyspnea on exertion    Incontinence    GILDARDO (obstructive sleep apnea)    Morbid obesity with BMI of 50 0-59 9, adult ([facility] )      Morbid obesity with BMI of 50 0-59 9, adult ([facility] )  Assessment & Plan  Patient with morbid obesity, BMI at 48   - Consider outpatient discussion for lifestyle and weight loss modifications      GILDARDO (obstructive sleep apnea)  Assessment & Plan  History of GILDARDO compliant on CPAP    Plan: Will continue CPAP inpatient    Incontinence  Assessment & Plan  Has history of incontinence managed on oxybutynin 5 mg  Recently treated for UTI with Augmentin 5 day course 2-3 weeks ago  Also has has history of RCC on the right treated cryotherapy about 6 years  Patient reported worsening incontinence for the past 2-3 weeks with the dysuria or frequency  Etiology likely due to persistent UTI secondary to inadequate treatment with Augmentin  Plan:  · Order UA and consider antibiotic therapy based on results  · UA significant for turbid urine, pyuria, bacteriuria  · Recently completed 5 day course of Augmentin to treat Klebsiella UTI   Suspect patient likely has persistent UTI though clinical presentation complicated by chronic urinary incontinence poorly managed by oxybutynin  · Continue empiric treatment with cefazolin 2g for three days; currently on day 2/3; will adjust antibiotic to pending urine culture/sensitivities  · Continue home oxybutynin  · Urology consulted for persistent incontinent, appreciate recs  · See plan above  · Consider a beta 3 agonist in conjunction with oxybutynin if incontinence does not improve    Dyspnea on exertion  Assessment & Plan  Patient reports 1-2 weeks of shortness of breath in the setting of obesity and permanent AFib  Low clinical suspicion for pneumonia at this time  PE also possible given clinical presentation  PERC score 3  Wells score for PE 9    Plan:  · High sensitivity troponins  · 10/11/9 at 0h/2h/4h  · Obtain CXR  · Negative for active cardiopulmonary disease  · Order CTA to rule out PE  · No emboli in the main, right, or left pulmonary arteries  · While there are no definite segmental or subsegmental emboli, they can be missed due to suboptimal opacification of the pulmonary arteries  · Pulmonary artery enlargement which can be seen with pulmonary hypertension  · Obtain TTE to assess for pulmonary HTN  · TTE without any signs of pulmonary hypertension including right ventricular strain  · Improving, patient able to tolerate PT and OT          Unsteady gait  Assessment & Plan  Patient reports 1-2 weeks of unsteady gait secondary shortness of breath in the setting of advanced osteoarthritis of the knee   No focal neurological focal deficits on exam      Plan:  · Given patient's history of Afib and history of anticoagulation, unsteady gait may be secondary to CVA  · Order CT head for further evaluation   · Negative for acute intracranial pathology      Permanent atrial fibrillation [facility])  Assessment & Plan  History of permanent AFib on warfarin 5mg daily with subtherapeutic INR 1 43 on arrival  Not on DOAC because of price  Rate controlled with diltiazem at home    Plan:  · Warfarin increased to 7 5 and bridge with heparin drip on admission  · Most recent INR 1 3 on Warfarin 7 5; remains subtherapeutic  · Await repeat INR from this AM  · Consider increasing warfarin dose today, repeat INR tomorrow AM; goal INR 2-2 5  · Continue diltiazem 360mg daily  · Consider oral anticoagulant at discharge  · Patient was bridged with heparin with INR earlier today at 1 79, placed on warfarin regimen with daily INR checks for the next 7 days and to be bridged for the next 2 days on Lovenox  Malignant tumor of right kidney parenchyma (HCC)  Assessment & Plan  History of RCC on the right status post treatment cryotherapy about 6 years ago  Stable and continue monitoring  Patient follows up with Urology regularly    Plan:    follow-up with Urology outpatient    * SIRS (systemic inflammatory response syndrome) (HCC)  Assessment & Plan  · Met SIRS criteria with fever 100 4 and denies any tachycardia 103  · Recently treated for UTI with Augmentin and finished 5 day course  · COVID/RSV/flu negative  · No signs or symptoms of upper respiratory infection besides shortness of breath     Plan:  · Order UA and consider antibiotic therapy based on results  · UA significant for turbid urine, pyuria, bacteriuria  · Recently completed 5 day course of Augmentin to treat Klebsiella UTI  Suspect patient likely has persistent UTI though clinical presentation complicated by chronic urinary incontinence poorly managed by oxybutynin  Blood cultures and urine cultures noted  Patient's urine cultures were positive for ESBL E coli with susceptibilities to Augmentin  Patient was subsequently placed on Augmentin PO regimen and cefazolin was discontinued because of resistance  As result, patient was placed on 7 day regimen of PO Augmentin  While Augmentin does cause changes in warfarin levels, it may be less likely to affected overall as compared to other antibiotics on the susceptibility list because of which patient was finally placed on Augmentin  Patient's family agreeable to this  Elevated TSH-resolved as of 10/31/2022  Assessment & Plan  Previous TSH elevated with normal T4    Plan:   Will repeat TSH and consider therapy if warranted        [address]     As per Dr Apolinar Bo HPI "The patient is a 67-year-old female past medical of RCC, AFib on warfarin diltiazem, osteoporosis, osteoarthritis, and GILDARDO on CPAP  Presenting for 2-3 weeks of shortness of breath, unsteady gait, and incontinence  He recently finished a course of Augmentin to 3 weeks ago for UTI  She has issues baseline urinary incontinence however recently has been much more pronounced and has been wearing pads  The patient takes oxybutynin for incontinence  She also has history of RCC treated with cryotherapy 6 years ago  Patient reports with 2 day history of fevers and chills but no cough or rhinorrhea  Patient denies any sick contacts or recent travel history       Patient also complained of unsteady gait in the setting of advanced osteoarthritis of the knees bilaterally  She states for the past 1-2 weeks she feels short of breath causing difficulty with ambulation  She states at baseline she has difficulty ambulating because of osteoarthritis has recently been feeling more unsteady     Patient also complaining of shortness of breath with no associated chest pain or chest tightness  She denies any cough or rhinorrhea at this time however reports fevers and chills for the past 2 days  She also states she has also felt palpitations and uncontrolled tachycardia which she attributes to her permanent Afib       Patient reports no history of smoking  Patient reports full code  He states having taken all her medications this morning  Patient was grossly sedentary lifestyle  Patient is not up-to-date with cancer screening"    Patient's incontinence improved during the course of her admission, her CTA PE study was negative for pulmonary embolism  An echocardiogram was not consistent with pulmonary hypertension  Patient's blood culture showed no growth, her urine cultures were positive for ESBL E coli  While she was initially initiated on cefazolin, antibiotics were adjusted to Augmentin because of susceptibility    Patient also had a subtherapeutic INR on presentation which was appropriately managed with INR at the time of discharge at 1 79 and patient was continued on bridge to warfarin with Lovenox at time of discharge  At this time, patient is medically stable to be discharged to hold or chart facility  Patient has been tolerating PT and OT well  In the future, patient is strongly urged to follow-up with her PCP and Urology  Physical Exam  Vitals reviewed  Constitutional:       Appearance: She is obese  HENT:      Head: Normocephalic  Cardiovascular:      Rate and Rhythm: Normal rate  Rhythm irregular  Pulses: Normal pulses  Heart sounds: Normal heart sounds  Pulmonary:      Effort: Pulmonary effort is normal       Breath sounds: Normal breath sounds  Abdominal:      General: Bowel sounds are normal       Palpations: Abdomen is soft  Skin:     General: Skin is warm and dry  Capillary Refill: Capillary refill takes less than 2 seconds  Neurological:      Mental Status: She is alert and oriented to person, place, and time             DISCHARGE INFORMATION     PCP at Discharge:  Jade Mario DO      Admitting Provider: Dedra Quinn MD  Admission Date: 10/30/2022    Discharge Provider: Dedra Quinn*  Discharge Date: 11/2/2022    Discharge Disposition: Home/Self Care  Discharge Condition: stable  Discharge with Lines: no    Discharge Diet: regular diet  Activity Restrictions: none  Test Results Pending at Discharge: INR for next 7 days    Discharge Diagnoses:  Principal Problem:    SIRS (systemic inflammatory response syndrome) ([facility] )  Active Problems:    Malignant tumor of right kidney parenchyma (HCC)    Permanent atrial fibrillation (HCC)    Unsteady gait    Dyspnea on exertion    Incontinence    GILDARDO (obstructive sleep apnea)    Morbid obesity with BMI of 50 0-59 9, adult ([facility] )  Resolved Problems:    Elevated TSH      Consulting Providers:      Diagnostic & Therapeutic Procedures Performed:  XR chest 2 views    Result Date: 10/31/2022  Impression: No radiographic evidence of acute intrathoracic process  Workstation performed: [id number]     CT head wo contrast    Result Date: 10/30/2022  Impression: No acute intracranial abnormality  Workstation performed: [id number]     CTA chest pe study    Result Date: 10/30/2022  Impression: No emboli in the main, right, or left pulmonary arteries  While there are no definite segmental or subsegmental emboli, they can be missed due to suboptimal opacification of the pulmonary arteries  No acute pulmonary disease  Pulmonary artery enlargement which can be seen with pulmonary hypertension    Workstation performed: [id number]       Code Status: Level 1 - Full Code  Advance Directive & Living Will: <no information>  Power of :    POLST:      Medications:  Current Discharge Medication List      STOP taking these medications       rivaroxaban (Xarelto) 20 mg tablet Comments:   Reason for Stopping:             Current Discharge Medication List      START taking these medications    Details   enoxaparin (LOVENOX) 300 mg/3 mL Inject 1 4 mL (140 mg total) under the skin every 12 (twelve) hours for 2 days  Qty: 5 6 mL, Refills: 0    Associated Diagnoses: INR (international normal ratio) abnormal      polyethylene glycol (MIRALAX) 17 g packet Take 17 g by mouth daily for 15 days  Qty: 255 g, Refills: 0    Associated Diagnoses: Constipation           Current Discharge Medication List      CONTINUE these medications which have NOT CHANGED    Details   acetaminophen (TYLENOL) 325 mg tablet Take 975 mg by mouth every 6 (six) hours as needed      diltiazem (CARDIZEM CD) 360 MG 24 hr capsule Take 1 capsule (360 mg total) by mouth daily  Qty: 90 capsule, Refills: 3    Associated Diagnoses: Permanent atrial fibrillation (HCC)      ergocalciferol (VITAMIN D2) 50,000 units Take 50,000 Units by mouth once a week Takes D3       FLUoxetine (PROzac) 20 mg capsule Take 20 mg by mouth 2 (two) times a day gabapentin (NEURONTIN) 300 mg capsule Take 600 mg by mouth daily at bedtime       hydrochlorothiazide (HYDRODIURIL) 25 mg tablet Take 25 mg by mouth daily      LORazepam (ATIVAN) 1 mg tablet Take 1 mg by mouth daily at bedtime as needed for anxiety  oxybutynin (DITROPAN) 5 mg tablet 2 (two) times a day             Allergies:  No Known Allergies    FOLLOW-UP     PCP Outpatient Follow-up:  Yes    Consulting Providers Follow-up:  Urology     Active Issues Requiring Follow-up:   none    Discharge Statement:   I spent 45 minutes discharging the patient  This time was spent on the day of discharge  I had direct contact with the patient on the day of discharge  Additional documentation is required if more than 30 minutes were spent on discharge  Portions of the record may have been created with voice recognition software  Occasional wrong word or "sound a like" substitutions may have occurred due to the inherent limitations of voice recognition software  Read the chart carefully and recognize, using context, where substitutions have occurred        Nilda Jasso MD  Internal Medicine Residency PGY-2

## 2023-04-23 ENCOUNTER — HEALTH MAINTENANCE LETTER (OUTPATIENT)
Age: 43
End: 2023-04-23

## 2023-06-07 ENCOUNTER — APPOINTMENT (OUTPATIENT)
Dept: URBAN - METROPOLITAN AREA CLINIC 260 | Age: 43
Setting detail: DERMATOLOGY
End: 2023-06-07

## 2023-06-07 VITALS — HEIGHT: 55 IN | WEIGHT: 145 LBS

## 2023-06-07 DIAGNOSIS — L81.4 OTHER MELANIN HYPERPIGMENTATION: ICD-10-CM

## 2023-06-07 DIAGNOSIS — D22 MELANOCYTIC NEVI: ICD-10-CM

## 2023-06-07 DIAGNOSIS — D18.0 HEMANGIOMA: ICD-10-CM

## 2023-06-07 DIAGNOSIS — Z71.89 OTHER SPECIFIED COUNSELING: ICD-10-CM

## 2023-06-07 DIAGNOSIS — Z87.2 PERSONAL HISTORY OF DISEASES OF THE SKIN AND SUBCUTANEOUS TISSUE: ICD-10-CM

## 2023-06-07 PROBLEM — D18.01 HEMANGIOMA OF SKIN AND SUBCUTANEOUS TISSUE: Status: ACTIVE | Noted: 2023-06-07

## 2023-06-07 PROBLEM — D22.5 MELANOCYTIC NEVI OF TRUNK: Status: ACTIVE | Noted: 2023-06-07

## 2023-06-07 PROCEDURE — 99213 OFFICE O/P EST LOW 20 MIN: CPT

## 2023-06-07 PROCEDURE — OTHER COUNSELING: OTHER

## 2023-06-07 PROCEDURE — OTHER MIPS QUALITY: OTHER

## 2023-06-07 ASSESSMENT — LOCATION DETAILED DESCRIPTION DERM
LOCATION DETAILED: INFERIOR THORACIC SPINE
LOCATION DETAILED: MIDDLE STERNUM
LOCATION DETAILED: LEFT POSTERIOR SHOULDER
LOCATION DETAILED: RIGHT POSTERIOR SHOULDER

## 2023-06-07 ASSESSMENT — LOCATION SIMPLE DESCRIPTION DERM
LOCATION SIMPLE: CHEST
LOCATION SIMPLE: RIGHT SHOULDER
LOCATION SIMPLE: LEFT SHOULDER
LOCATION SIMPLE: UPPER BACK

## 2023-06-07 ASSESSMENT — LOCATION ZONE DERM
LOCATION ZONE: TRUNK
LOCATION ZONE: ARM

## 2023-08-02 ENCOUNTER — TRANSFERRED RECORDS (OUTPATIENT)
Dept: HEALTH INFORMATION MANAGEMENT | Facility: CLINIC | Age: 43
End: 2023-08-02

## 2024-01-23 ENCOUNTER — TRANSFERRED RECORDS (OUTPATIENT)
Dept: HEALTH INFORMATION MANAGEMENT | Facility: CLINIC | Age: 44
End: 2024-01-23
Payer: COMMERCIAL

## 2024-02-10 ENCOUNTER — HEALTH MAINTENANCE LETTER (OUTPATIENT)
Age: 44
End: 2024-02-10

## 2024-02-13 ENCOUNTER — APPOINTMENT (OUTPATIENT)
Dept: URBAN - METROPOLITAN AREA CLINIC 260 | Age: 44
Setting detail: DERMATOLOGY
End: 2024-02-14

## 2024-02-13 VITALS — WEIGHT: 150 LBS | RESPIRATION RATE: 16 BRPM | HEIGHT: 65 IN

## 2024-02-13 DIAGNOSIS — L82.1 OTHER SEBORRHEIC KERATOSIS: ICD-10-CM

## 2024-02-13 PROCEDURE — OTHER COUNSELING: OTHER

## 2024-02-13 PROCEDURE — 99212 OFFICE O/P EST SF 10 MIN: CPT

## 2024-02-13 ASSESSMENT — LOCATION DETAILED DESCRIPTION DERM: LOCATION DETAILED: LEFT INFERIOR FOREHEAD

## 2024-02-13 ASSESSMENT — LOCATION SIMPLE DESCRIPTION DERM: LOCATION SIMPLE: LEFT FOREHEAD

## 2024-02-13 ASSESSMENT — LOCATION ZONE DERM: LOCATION ZONE: FACE

## 2024-02-13 NOTE — HPI: SKIN LESION
What Type Of Note Output Would You Prefer (Optional)?: Standard Output
How Severe Is Your Skin Lesion?: mild
Is This A New Presentation, Or A Follow-Up?: Skin Lesions
Which Family Member (Optional)?: Mother-bcc, Paternal Grandfather-skin cancer

## 2024-04-15 RX ORDER — CHOLECALCIFEROL (VITAMIN D3) 1MM UNIT/G
1 LIQUID (GRAM) MISCELLANEOUS DAILY
COMMUNITY

## 2024-04-15 RX ORDER — OMEGA-3S/DHA/EPA/FISH OIL/D3 300MG-1000
1000 CAPSULE ORAL DAILY
COMMUNITY

## 2024-04-15 RX ORDER — LANOLIN ALCOHOL/MO/W.PET/CERES
3 CREAM (GRAM) TOPICAL
COMMUNITY

## 2024-04-16 ENCOUNTER — ANESTHESIA EVENT (OUTPATIENT)
Dept: SURGERY | Facility: HOSPITAL | Age: 44
End: 2024-04-16
Payer: COMMERCIAL

## 2024-04-17 ENCOUNTER — HOSPITAL ENCOUNTER (OUTPATIENT)
Facility: HOSPITAL | Age: 44
Discharge: HOME | End: 2024-04-17
Attending: OBSTETRICS & GYNECOLOGY | Admitting: OBSTETRICS & GYNECOLOGY
Payer: COMMERCIAL

## 2024-04-17 ENCOUNTER — ANESTHESIA (OUTPATIENT)
Dept: SURGERY | Facility: HOSPITAL | Age: 44
End: 2024-04-17
Payer: COMMERCIAL

## 2024-04-17 VITALS
TEMPERATURE: 98.1 F | BODY MASS INDEX: 25.37 KG/M2 | WEIGHT: 156 LBS | DIASTOLIC BLOOD PRESSURE: 73 MMHG | RESPIRATION RATE: 16 BRPM | HEART RATE: 84 BPM | OXYGEN SATURATION: 98 % | SYSTOLIC BLOOD PRESSURE: 122 MMHG

## 2024-04-17 DIAGNOSIS — Z90.710 H/O: HYSTERECTOMY: Primary | ICD-10-CM

## 2024-04-17 LAB
ABO/RH(D): NORMAL
ANTIBODY SCREEN: NEGATIVE
HCG INTACT+B SERPL-ACNC: <1 MIU/ML
SPECIMEN EXPIRATION DATE: NORMAL

## 2024-04-17 PROCEDURE — 370N000017 HC ANESTHESIA TECHNICAL FEE, PER MIN: Performed by: OBSTETRICS & GYNECOLOGY

## 2024-04-17 PROCEDURE — 258N000003 HC RX IP 258 OP 636: Performed by: OBSTETRICS & GYNECOLOGY

## 2024-04-17 PROCEDURE — 84702 CHORIONIC GONADOTROPIN TEST: CPT | Performed by: NURSE PRACTITIONER

## 2024-04-17 PROCEDURE — 250N000011 HC RX IP 250 OP 636: Performed by: OBSTETRICS & GYNECOLOGY

## 2024-04-17 PROCEDURE — 250N000011 HC RX IP 250 OP 636: Performed by: NURSE PRACTITIONER

## 2024-04-17 PROCEDURE — 250N000009 HC RX 250

## 2024-04-17 PROCEDURE — 272N000001 HC OR GENERAL SUPPLY STERILE: Performed by: OBSTETRICS & GYNECOLOGY

## 2024-04-17 PROCEDURE — 250N000011 HC RX IP 250 OP 636

## 2024-04-17 PROCEDURE — 36415 COLL VENOUS BLD VENIPUNCTURE: CPT | Performed by: NURSE PRACTITIONER

## 2024-04-17 PROCEDURE — 250N000009 HC RX 250: Performed by: OBSTETRICS & GYNECOLOGY

## 2024-04-17 PROCEDURE — 710N000012 HC RECOVERY PHASE 2, PER MINUTE: Performed by: OBSTETRICS & GYNECOLOGY

## 2024-04-17 PROCEDURE — 710N000009 HC RECOVERY PHASE 1, LEVEL 1, PER MIN: Performed by: OBSTETRICS & GYNECOLOGY

## 2024-04-17 PROCEDURE — 258N000003 HC RX IP 258 OP 636: Performed by: ANESTHESIOLOGY

## 2024-04-17 PROCEDURE — 88311 DECALCIFY TISSUE: CPT | Mod: TC | Performed by: OBSTETRICS & GYNECOLOGY

## 2024-04-17 PROCEDURE — 88307 TISSUE EXAM BY PATHOLOGIST: CPT | Mod: 26 | Performed by: PATHOLOGY

## 2024-04-17 PROCEDURE — 250N000013 HC RX MED GY IP 250 OP 250 PS 637: Performed by: OBSTETRICS & GYNECOLOGY

## 2024-04-17 PROCEDURE — 250N000009 HC RX 250: Performed by: NURSE ANESTHETIST, CERTIFIED REGISTERED

## 2024-04-17 PROCEDURE — 258N000003 HC RX IP 258 OP 636

## 2024-04-17 PROCEDURE — 86900 BLOOD TYPING SEROLOGIC ABO: CPT | Performed by: NURSE PRACTITIONER

## 2024-04-17 PROCEDURE — 88311 DECALCIFY TISSUE: CPT | Mod: 26 | Performed by: PATHOLOGY

## 2024-04-17 PROCEDURE — 250N000011 HC RX IP 250 OP 636: Performed by: NURSE ANESTHETIST, CERTIFIED REGISTERED

## 2024-04-17 PROCEDURE — 999N000141 HC STATISTIC PRE-PROCEDURE NURSING ASSESSMENT: Performed by: OBSTETRICS & GYNECOLOGY

## 2024-04-17 PROCEDURE — 360N000080 HC SURGERY LEVEL 7, PER MIN: Performed by: OBSTETRICS & GYNECOLOGY

## 2024-04-17 PROCEDURE — 250N000013 HC RX MED GY IP 250 OP 250 PS 637: Performed by: ANESTHESIOLOGY

## 2024-04-17 PROCEDURE — 250N000011 HC RX IP 250 OP 636: Performed by: ANESTHESIOLOGY

## 2024-04-17 RX ORDER — ACETAMINOPHEN 325 MG/1
975 TABLET ORAL ONCE
Status: DISCONTINUED | OUTPATIENT
Start: 2024-04-17 | End: 2024-04-17

## 2024-04-17 RX ORDER — OXYCODONE HYDROCHLORIDE 5 MG/1
5 TABLET ORAL
Status: COMPLETED | OUTPATIENT
Start: 2024-04-17 | End: 2024-04-17

## 2024-04-17 RX ORDER — KETAMINE HYDROCHLORIDE 10 MG/ML
INJECTION INTRAMUSCULAR; INTRAVENOUS PRN
Status: DISCONTINUED | OUTPATIENT
Start: 2024-04-17 | End: 2024-04-17

## 2024-04-17 RX ORDER — ONDANSETRON 2 MG/ML
4 INJECTION INTRAMUSCULAR; INTRAVENOUS EVERY 30 MIN PRN
Status: DISCONTINUED | OUTPATIENT
Start: 2024-04-17 | End: 2024-04-17 | Stop reason: HOSPADM

## 2024-04-17 RX ORDER — SODIUM CHLORIDE, SODIUM LACTATE, POTASSIUM CHLORIDE, CALCIUM CHLORIDE 600; 310; 30; 20 MG/100ML; MG/100ML; MG/100ML; MG/100ML
INJECTION, SOLUTION INTRAVENOUS CONTINUOUS
Status: DISCONTINUED | OUTPATIENT
Start: 2024-04-17 | End: 2024-04-17 | Stop reason: HOSPADM

## 2024-04-17 RX ORDER — OXYCODONE HYDROCHLORIDE 5 MG/1
5 TABLET ORAL EVERY 6 HOURS PRN
Qty: 10 TABLET | Refills: 0 | Status: SHIPPED | OUTPATIENT
Start: 2024-04-17 | End: 2024-04-20

## 2024-04-17 RX ORDER — FENTANYL CITRATE 50 UG/ML
50 INJECTION, SOLUTION INTRAMUSCULAR; INTRAVENOUS EVERY 5 MIN PRN
Status: DISCONTINUED | OUTPATIENT
Start: 2024-04-17 | End: 2024-04-17 | Stop reason: HOSPADM

## 2024-04-17 RX ORDER — IBUPROFEN 200 MG
800 TABLET ORAL ONCE
Status: DISCONTINUED | OUTPATIENT
Start: 2024-04-17 | End: 2024-04-17 | Stop reason: HOSPADM

## 2024-04-17 RX ORDER — CEFAZOLIN SODIUM/WATER 2 G/20 ML
2 SYRINGE (ML) INTRAVENOUS SEE ADMIN INSTRUCTIONS
Status: DISCONTINUED | OUTPATIENT
Start: 2024-04-17 | End: 2024-04-17 | Stop reason: HOSPADM

## 2024-04-17 RX ORDER — NALOXONE HYDROCHLORIDE 0.4 MG/ML
0.1 INJECTION, SOLUTION INTRAMUSCULAR; INTRAVENOUS; SUBCUTANEOUS
Status: DISCONTINUED | OUTPATIENT
Start: 2024-04-17 | End: 2024-04-17 | Stop reason: HOSPADM

## 2024-04-17 RX ORDER — KETOROLAC TROMETHAMINE 30 MG/ML
INJECTION, SOLUTION INTRAMUSCULAR; INTRAVENOUS PRN
Status: DISCONTINUED | OUTPATIENT
Start: 2024-04-17 | End: 2024-04-17

## 2024-04-17 RX ORDER — FENTANYL CITRATE 50 UG/ML
25 INJECTION, SOLUTION INTRAMUSCULAR; INTRAVENOUS EVERY 5 MIN PRN
Status: DISCONTINUED | OUTPATIENT
Start: 2024-04-17 | End: 2024-04-17 | Stop reason: HOSPADM

## 2024-04-17 RX ORDER — PROPOFOL 10 MG/ML
INJECTION, EMULSION INTRAVENOUS PRN
Status: DISCONTINUED | OUTPATIENT
Start: 2024-04-17 | End: 2024-04-17

## 2024-04-17 RX ORDER — ONDANSETRON 2 MG/ML
INJECTION INTRAMUSCULAR; INTRAVENOUS PRN
Status: DISCONTINUED | OUTPATIENT
Start: 2024-04-17 | End: 2024-04-17

## 2024-04-17 RX ORDER — ONDANSETRON 4 MG/1
4 TABLET, ORALLY DISINTEGRATING ORAL EVERY 30 MIN PRN
Status: DISCONTINUED | OUTPATIENT
Start: 2024-04-17 | End: 2024-04-17 | Stop reason: HOSPADM

## 2024-04-17 RX ORDER — DEXAMETHASONE SODIUM PHOSPHATE 10 MG/ML
INJECTION, SOLUTION INTRAMUSCULAR; INTRAVENOUS PRN
Status: DISCONTINUED | OUTPATIENT
Start: 2024-04-17 | End: 2024-04-17

## 2024-04-17 RX ORDER — BUPIVACAINE HYDROCHLORIDE 2.5 MG/ML
INJECTION, SOLUTION INFILTRATION; PERINEURAL PRN
Status: DISCONTINUED | OUTPATIENT
Start: 2024-04-17 | End: 2024-04-17 | Stop reason: HOSPADM

## 2024-04-17 RX ORDER — HYDROMORPHONE HYDROCHLORIDE 1 MG/ML
0.2 INJECTION, SOLUTION INTRAMUSCULAR; INTRAVENOUS; SUBCUTANEOUS EVERY 5 MIN PRN
Status: DISCONTINUED | OUTPATIENT
Start: 2024-04-17 | End: 2024-04-17 | Stop reason: HOSPADM

## 2024-04-17 RX ORDER — LIDOCAINE 40 MG/G
CREAM TOPICAL
Status: DISCONTINUED | OUTPATIENT
Start: 2024-04-17 | End: 2024-04-17 | Stop reason: HOSPADM

## 2024-04-17 RX ORDER — ACETAMINOPHEN 325 MG/1
975 TABLET ORAL ONCE
Status: COMPLETED | OUTPATIENT
Start: 2024-04-17 | End: 2024-04-17

## 2024-04-17 RX ORDER — FENTANYL CITRATE 50 UG/ML
INJECTION, SOLUTION INTRAMUSCULAR; INTRAVENOUS PRN
Status: DISCONTINUED | OUTPATIENT
Start: 2024-04-17 | End: 2024-04-17

## 2024-04-17 RX ORDER — ACETAMINOPHEN 325 MG/1
975 TABLET ORAL ONCE
Status: DISCONTINUED | OUTPATIENT
Start: 2024-04-17 | End: 2024-04-17 | Stop reason: HOSPADM

## 2024-04-17 RX ORDER — PROPOFOL 10 MG/ML
INJECTION, EMULSION INTRAVENOUS CONTINUOUS PRN
Status: DISCONTINUED | OUTPATIENT
Start: 2024-04-17 | End: 2024-04-17

## 2024-04-17 RX ORDER — HYDROMORPHONE HYDROCHLORIDE 1 MG/ML
0.4 INJECTION, SOLUTION INTRAMUSCULAR; INTRAVENOUS; SUBCUTANEOUS EVERY 5 MIN PRN
Status: DISCONTINUED | OUTPATIENT
Start: 2024-04-17 | End: 2024-04-17 | Stop reason: HOSPADM

## 2024-04-17 RX ORDER — CEFAZOLIN SODIUM/WATER 2 G/20 ML
2 SYRINGE (ML) INTRAVENOUS
Status: COMPLETED | OUTPATIENT
Start: 2024-04-17 | End: 2024-04-17

## 2024-04-17 RX ORDER — MAGNESIUM SULFATE 4 G/50ML
4 INJECTION INTRAVENOUS ONCE
Status: COMPLETED | OUTPATIENT
Start: 2024-04-17 | End: 2024-04-17

## 2024-04-17 RX ORDER — SODIUM CHLORIDE, SODIUM LACTATE, POTASSIUM CHLORIDE, AND CALCIUM CHLORIDE .6; .31; .03; .02 G/100ML; G/100ML; G/100ML; G/100ML
IRRIGANT IRRIGATION PRN
Status: DISCONTINUED | OUTPATIENT
Start: 2024-04-17 | End: 2024-04-17 | Stop reason: HOSPADM

## 2024-04-17 RX ORDER — LIDOCAINE HYDROCHLORIDE 10 MG/ML
INJECTION, SOLUTION INFILTRATION; PERINEURAL PRN
Status: DISCONTINUED | OUTPATIENT
Start: 2024-04-17 | End: 2024-04-17

## 2024-04-17 RX ADMIN — SODIUM CHLORIDE, POTASSIUM CHLORIDE, SODIUM LACTATE AND CALCIUM CHLORIDE: 600; 310; 30; 20 INJECTION, SOLUTION INTRAVENOUS at 12:16

## 2024-04-17 RX ADMIN — ACETAMINOPHEN 975 MG: 325 TABLET ORAL at 09:27

## 2024-04-17 RX ADMIN — PHENYLEPHRINE HYDROCHLORIDE 0.4 MCG/KG/MIN: 10 INJECTION INTRAVENOUS at 11:45

## 2024-04-17 RX ADMIN — PROPOFOL 170 MG: 10 INJECTION, EMULSION INTRAVENOUS at 10:44

## 2024-04-17 RX ADMIN — KETAMINE HYDROCHLORIDE 10 MG: 10 INJECTION INTRAMUSCULAR; INTRAVENOUS at 11:35

## 2024-04-17 RX ADMIN — SUGAMMADEX 200 MG: 100 INJECTION, SOLUTION INTRAVENOUS at 13:41

## 2024-04-17 RX ADMIN — ROCURONIUM BROMIDE 40 MG: 50 INJECTION, SOLUTION INTRAVENOUS at 10:44

## 2024-04-17 RX ADMIN — HYDROMORPHONE HYDROCHLORIDE 0.2 MG: 1 INJECTION, SOLUTION INTRAMUSCULAR; INTRAVENOUS; SUBCUTANEOUS at 15:03

## 2024-04-17 RX ADMIN — KETOROLAC TROMETHAMINE 15 MG: 30 INJECTION, SOLUTION INTRAMUSCULAR at 13:37

## 2024-04-17 RX ADMIN — PHENYLEPHRINE HYDROCHLORIDE 100 MCG: 10 INJECTION INTRAVENOUS at 11:37

## 2024-04-17 RX ADMIN — DEXAMETHASONE SODIUM PHOSPHATE 10 MG: 10 INJECTION, SOLUTION INTRAMUSCULAR; INTRAVENOUS at 10:56

## 2024-04-17 RX ADMIN — FENTANYL CITRATE 25 MCG: 50 INJECTION INTRAMUSCULAR; INTRAVENOUS at 10:57

## 2024-04-17 RX ADMIN — ROCURONIUM BROMIDE 20 MG: 50 INJECTION, SOLUTION INTRAVENOUS at 12:12

## 2024-04-17 RX ADMIN — ROCURONIUM BROMIDE 40 MG: 50 INJECTION, SOLUTION INTRAVENOUS at 11:02

## 2024-04-17 RX ADMIN — HYDROMORPHONE HYDROCHLORIDE 0.5 MG: 1 INJECTION, SOLUTION INTRAMUSCULAR; INTRAVENOUS; SUBCUTANEOUS at 12:04

## 2024-04-17 RX ADMIN — SODIUM CHLORIDE, POTASSIUM CHLORIDE, SODIUM LACTATE AND CALCIUM CHLORIDE: 600; 310; 30; 20 INJECTION, SOLUTION INTRAVENOUS at 09:27

## 2024-04-17 RX ADMIN — ROCURONIUM BROMIDE 20 MG: 50 INJECTION, SOLUTION INTRAVENOUS at 11:45

## 2024-04-17 RX ADMIN — Medication 2 G: at 10:36

## 2024-04-17 RX ADMIN — MIDAZOLAM 2 MG: 1 INJECTION INTRAMUSCULAR; INTRAVENOUS at 10:36

## 2024-04-17 RX ADMIN — MAGNESIUM SULFATE HEPTAHYDRATE 4 G: 80 INJECTION, SOLUTION INTRAVENOUS at 09:27

## 2024-04-17 RX ADMIN — PROPOFOL 180 MCG/KG/MIN: 10 INJECTION, EMULSION INTRAVENOUS at 10:44

## 2024-04-17 RX ADMIN — HYDROMORPHONE HYDROCHLORIDE 0.2 MG: 1 INJECTION, SOLUTION INTRAMUSCULAR; INTRAVENOUS; SUBCUTANEOUS at 15:28

## 2024-04-17 RX ADMIN — FENTANYL CITRATE 25 MCG: 50 INJECTION, SOLUTION INTRAMUSCULAR; INTRAVENOUS at 14:35

## 2024-04-17 RX ADMIN — KETAMINE HYDROCHLORIDE 10 MG: 10 INJECTION INTRAMUSCULAR; INTRAVENOUS at 12:04

## 2024-04-17 RX ADMIN — ONDANSETRON 4 MG: 2 INJECTION INTRAMUSCULAR; INTRAVENOUS at 13:37

## 2024-04-17 RX ADMIN — FENTANYL CITRATE 50 MCG: 50 INJECTION INTRAMUSCULAR; INTRAVENOUS at 10:44

## 2024-04-17 RX ADMIN — LIDOCAINE HYDROCHLORIDE 30 MG: 10 INJECTION, SOLUTION INFILTRATION; PERINEURAL at 10:44

## 2024-04-17 RX ADMIN — PHENYLEPHRINE HYDROCHLORIDE 100 MCG: 10 INJECTION INTRAVENOUS at 11:40

## 2024-04-17 RX ADMIN — FENTANYL CITRATE 25 MCG: 50 INJECTION, SOLUTION INTRAMUSCULAR; INTRAVENOUS at 14:49

## 2024-04-17 RX ADMIN — KETAMINE HYDROCHLORIDE 30 MG: 10 INJECTION INTRAMUSCULAR; INTRAVENOUS at 11:01

## 2024-04-17 RX ADMIN — PHENYLEPHRINE HYDROCHLORIDE 100 MCG: 10 INJECTION INTRAVENOUS at 11:28

## 2024-04-17 RX ADMIN — FENTANYL CITRATE 25 MCG: 50 INJECTION INTRAMUSCULAR; INTRAVENOUS at 11:02

## 2024-04-17 RX ADMIN — OXYCODONE HYDROCHLORIDE 5 MG: 5 TABLET ORAL at 16:16

## 2024-04-17 ASSESSMENT — ACTIVITIES OF DAILY LIVING (ADL)
ADLS_ACUITY_SCORE: 20

## 2024-04-17 NOTE — ANESTHESIA PREPROCEDURE EVALUATION
Anesthesia Pre-Procedure Evaluation    Patient: Sophia Maynard   MRN: [id number] : 1980        Procedure : Procedure(s):  DA NUBIA TOTAL LAPAROSCOPIC HYSTERECTOMY BILATERAL SALPINGECTOMY, CYSTOSCOPY          Past Medical History:   Diagnosis Date     Fibroid uterus     multiple large     Multiple head injury     hx multiple head injuries/concussions     Uncomplicated asthma [id number]      Past Surgical History:   Procedure Laterality Date     BIOPSY OF SKIN LESION Left 2015    upper left back area     WISDOM TOOTH EXTRACTION        Allergies   Allergen Reactions     Sulfa Antibiotics Anaphylaxis and Hives     Amoxicillin-Pot Clavulanate GI Disturbance     Cats      Dust Mites      Pollen Extract      Seasonal Allergies      Smoke.       Social History     Tobacco Use     Smoking status: Never     Smokeless tobacco: Never   Substance Use Topics     Alcohol use: Yes     Comment: very rare, only about 1-2 per month      Wt Readings from Last 1 Encounters:   24 70.8 kg (156 lb)        Anesthesia Evaluation   Pt has had prior anesthetic.         ROS/MED HX  ENT/Pulmonary:     (+)                      asthma                  Neurologic:  - neg neurologic ROS     Cardiovascular:  - neg cardiovascular ROS     METS/Exercise Tolerance:     Hematologic:  - neg hematologic  ROS     Musculoskeletal:  - neg musculoskeletal ROS     GI/Hepatic:  - neg GI/hepatic ROS     Renal/Genitourinary:  - neg Renal ROS     Endo:  - neg endo ROS     Psychiatric/Substance Use:  - neg psychiatric ROS     Infectious Disease:  - neg infectious disease ROS     Malignancy:  - neg malignancy ROS     Other:  - neg other ROS        Physical Exam    Airway  airway exam normal      Mallampati: I   TM distance: > 3 FB   Neck ROM: full   Mouth opening: > 3 cm    Respiratory Devices and Support         Dental  no notable dental history     (+) Minor Abnormalities - some fillings, tiny chips      Cardiovascular   cardiovascular  "exam normal       Rhythm and rate: regular and normal     Pulmonary   pulmonary exam normal        breath sounds clear to auscultation       OUTSIDE LABS:  CBC:   Lab Results   Component Value Date    HGB 12.1 10/07/2017    HGB 13.2 10/06/2017    HCT 39.0 03/23/2017     03/23/2017     BMP: No results found for: \"NA\", \"POTASSIUM\", \"CHLORIDE\", \"CO2\", \"BUN\", \"CR\", \"GLC\"  COAGS: No results found for: \"PTT\", \"INR\", \"FIBR\"  POC: No results found for: \"BGM\", \"HCG\", \"HCGS\"  HEPATIC: No results found for: \"ALBUMIN\", \"PROTTOTAL\", \"ALT\", \"AST\", \"GGT\", \"ALKPHOS\", \"BILITOTAL\", \"BILIDIRECT\", \"SNEHA\"  OTHER: No results found for: \"PH\", \"LACT\", \"A1C\", \"WESLY\", \"PHOS\", \"MAG\", \"LIPASE\", \"AMYLASE\", \"TSH\", \"T4\", \"T3\", \"CRP\", \"SED\"    Anesthesia Plan    ASA Status:  2    NPO Status:  NPO Appropriate    Anesthesia Type: General.     - Airway: ETT   Induction: Intravenous, Propofol.   Maintenance: TIVA.        Consents    Anesthesia Plan(s) and associated risks, benefits, and realistic alternatives discussed. Questions answered and patient/representative(s) expressed understanding.     - Discussed:     - Discussed with:  Patient      - Extended Intubation/Ventilatory Support Discussed: Yes.      - Patient is DNR/DNI Status: No     Use of blood products discussed: Yes.     - Discussed with: Patient.     Postoperative Care    Pain management: Multi-modal analgesia.   PONV prophylaxis: Ondansetron (or other 5HT-3), Dexamethasone or Solumedrol     Comments:             Sharif Barriga MD    I have reviewed the pertinent notes and labs in the chart from the past 30 days and (re)examined the patient.  Any updates or changes from those notes are reflected in this note.                  "

## 2024-04-17 NOTE — ANESTHESIA POSTPROCEDURE EVALUATION
Patient: Sophia Maynard    Procedure: Procedure(s):  DA NUBIA TOTAL LAPAROSCOPIC HYSTERECTOMY BILATERAL SALPINGECTOMY, CYSTOSCOPY       Anesthesia Type:  General    Note:  Disposition: Outpatient   Postop Pain Control: Uneventful            Sign Out: Well controlled pain   PONV: No   Neuro/Psych: Uneventful            Sign Out: Acceptable/Baseline neuro status   Airway/Respiratory: Uneventful            Sign Out: Acceptable/Baseline resp. status   CV/Hemodynamics: Uneventful            Sign Out: Acceptable CV status; No obvious hypovolemia; No obvious fluid overload   Other NRE: NONE   DID A NON-ROUTINE EVENT OCCUR? No       Last vitals:  Vitals Value Taken Time   /68 04/17/24 1530   Temp 36.7  C (98.1  F) 04/17/24 1355   Pulse 91 04/17/24 1542   Resp 14 04/17/24 1542   SpO2 94 % 04/17/24 1542   Vitals shown include unfiled device data.    Electronically Signed By: Sharif Barriga MD  April 17, 2024  4:07 PM

## 2024-04-17 NOTE — ANESTHESIA PROCEDURE NOTES
Airway       Patient location during procedure: OR       Procedure Start/Stop Times: 4/17/2024 10:47 AM  Staff -        CRNA: Jamil Marquez APRN CRNA       Performed By: CRNA  Consent for Airway        Urgency: elective  Indications and Patient Condition       Indications for airway management: allyn-procedural       Induction type:intravenous       Mask difficulty assessment: 1 - vent by mask    Final Airway Details       Final airway type: endotracheal airway       Successful airway: ETT - single and Oral  Endotracheal Airway Details        ETT size (mm): 7.0       Cuffed: yes       Successful intubation technique: direct laryngoscopy       DL Blade Type: MAC 3       Grade View of Cords: 1       Adjucts: stylet       Position: Right       Measured from: lips       Secured at (cm): 22       Bite block used: None    Post intubation assessment        Placement verified by: capnometry, equal breath sounds and chest rise        Number of attempts at approach: 1       Number of other approaches attempted: 0       Secured with: silk tape       Ease of procedure: easy       Dentition: Intact and Unchanged       Dental guard used and removed. Dental Guard Type: Standard White.    Medication(s) Administered   Medication Administration Time: 4/17/2024 10:47 AM

## 2024-04-17 NOTE — OP NOTE
Name:  Sophia Maynard  Record # [id number]   : 1980  Care Provider: Darlin Eid MD   Admit Date:  2024       Obstetrics Gynecology - Operative Report    DATE OF SERVICE: 2024     PREOPERATIVE DIAGNOSIS:  symptomatic uterine fibroid  POSTOPERATIVE DIAGNOSIS: SAME    PROCEDURE:     Da Kriss total laparoscopic assisted hysterectomy, bilateral salpingectomy   cystoscopy:  Contained uterine morselization.    FINDINGS: Very enlarged fibroid uterus was noted .  Normal ovaries bilateral normal fallopian tubes bilateral via cystoscopy normal bladder with eeflux of urine from both ureters at the completion of the procedure.    Uterine weight: 730grams    PHYSICIAN:  Darlin Eid MD     ASSISTANT:  Gaby Rizvi     ESTIMATED BLOOD LOSS: 20 ml    ANESTHESIA:  General.    SPECIMENS REMOVED:  Uterus with fibroids, cervix and bilateral tubes.     COMPLICATIONS:  none noted.    COMMENTS: Sophia Maynard  was met preoperatively with her  where we discussed the procedure and the risks associated with the procedure.  She understood these to be, but not limited to injury to adjacent organs including bladder, bowel, ureter, infection and bleeding.  Patient signed consent and was brought back to the operating room in stable condition.    Patient underwent induction of a general anesthetic, she was carefully prepped and draped for the procedure. Timeout was performed. Bladder was drained with a Mancia catheter, uterine manipulator placed into the uterus.      Attention was turned to the abdomen.  An incision was made above the umbilicus.  A Veress needle was introduced with a 2 pop technique, saline drop test confirmed adequate placement.   Opening pressure was 3.   Insufflation was now done until 15mm of pressure were established.  An 7/8 Davinci XI nonbladed trocar was placed above the umbilicus. Two other robotic assist ports were place approximately  to the LEFT of the initial incision and to  the LEFT of that.    A  right lateral quadrant 5 mm trocar was placed. A final Davinci port was placed to 9-10cm lateral to the umbilical incision.    At this junction the supraumbilical incision was extended to 3 cm.  This extension was carried down to the rectus fascia and the rectus fascia was entered and .  Both angles of the rectus fascia were tagged with a suture of 0 Vicryl..  The Lobo containment system and gel system was then used and the 17 bag was placed in the right upper quadrant.  Into the gel was the da Kriss port and the assist port.  This was connected and gas insufflated.  Trendelenburg assistance was used and the davinci was then brought to the patient s bedside.  The da Kriss was then docked.  The Vessel sealer and PK bipolar forceps were placed at the left da Kriss port, the monopolar scissors placed in right side of the body and I took my turn to the da Kriss console.     The procedure began with identifying the anatomy.  The uterus appeared very enlarged with limited mobility..    The tube on the left side was cauterized, transected and removed.  The ovarian ligament on the left side was then cauterized and transected.  The round ligament on the left side was cauterized and transected creating an anterior and posterior leave of the broad ligament. This was carried out in similar fashion on the right side.    The anterior bladder flap was created.    The ureter was then identified and its course where it dives under the uterine vasculature.      This entire step was than repeated on the right side.  At this junction, the uterine vasculature on both sides near the uterocervical isthmus were cauterized and transected.  This cauterization and transection was continued along the cervix until the level of the cardinal ligament.  At this junction anterior colpotomy and posterior colpotomy were performed.     The specimen was left in the pelvis the instruments were removed from the vagina.       Copious irrigation was done of the abdominal pelvic cavity.  Surgicel powder was placed on the vaginal cuff.    The vaginal cuff was closed with  O v-lock    suture.  Both angles were elevated to its ipsilateral uterosacral ligament with individual figure-of-8 sutures, of O vicryl.    Specimen was now placed into the 17 bag.  The bag was brought up in the supraumbilical area without difficulty.  It was then morselized.  Was noted to be very calcified.    Supraumbilical gel system was now removed.  The fascia was closed with 0 Vicryl.  The subcutaneous tissues were brought together with 0 plain and skin closed with 4-0 Monocryl.        At this junction, the procedure  was complete.  Sponge, lap and needle counts were correct x 2.  I took my turn to cystoscopy, noting normal ureter and bladder anatomy. Strong urine eflux bilaterally was noted from both ureteral orfices.  T  l.  Skin was closed with 4-0 monocryl . Steri-Strips applied.  She was brought to the recovery in stable condition.    Darlin Eid MD

## 2024-04-17 NOTE — H&P
H&P reviewed and no changes identified.  Reviewed risks of procedure in detail. Will proceed.  Darlin Eid MD  Specifically addressed the size of her uterus.  It is very enlarged with multiple fibroids.  We are planning to approach this laparoscopically but there is a chance for conversion to laparotomy.  Reviewed the risks of the surgery to include this, infection, bleeding, reoperation, injury to bowel bladder ureter.  All questions answered and addressed.  Darlin Eid MD

## 2024-04-17 NOTE — ANESTHESIA CARE TRANSFER NOTE
Patient: Sophia Maynard    Procedure: Procedure(s):  DA NUBIA TOTAL LAPAROSCOPIC HYSTERECTOMY BILATERAL SALPINGECTOMY, CYSTOSCOPY       Diagnosis: Uterine fibroid [D25.9]  Enlarged uterus [N85.2]  Diagnosis Additional Information: No value filed.    Anesthesia Type:   General     Note:    Oropharynx: oropharynx clear of all foreign objects  Level of Consciousness: drowsy  Oxygen Supplementation: face mask    Independent Airway: airway patency satisfactory and stable  Dentition: dentition unchanged  Vital Signs Stable: post-procedure vital signs reviewed and stable    Patient transferred to: PACU    Handoff Report: Identifed the Patient, Identified the Reponsible Provider, Reviewed the pertinent medical history, Discussed the surgical course, Reviewed Intra-OP anesthesia mangement and issues during anesthesia, Set expectations for post-procedure period and Allowed opportunity for questions and acknowledgement of understanding      Vitals:  Vitals Value Taken Time   /67 04/17/24 1355   Temp 36.7  C (98.1  F) 04/17/24 1355   Pulse 87 04/17/24 1400   Resp 26 04/17/24 1400   SpO2 100 % 04/17/24 1400       Electronically Signed By: TERRANCE Seo CRNA  April 17, 2024  2:01 PM

## 2024-04-19 LAB
PATH REPORT.COMMENTS IMP SPEC: NORMAL
PATH REPORT.COMMENTS IMP SPEC: NORMAL
PATH REPORT.FINAL DX SPEC: NORMAL
PATH REPORT.GROSS SPEC: NORMAL
PATH REPORT.MICROSCOPIC SPEC OTHER STN: NORMAL
PATH REPORT.RELEVANT HX SPEC: NORMAL
PHOTO IMAGE: NORMAL

## 2024-06-29 ENCOUNTER — HEALTH MAINTENANCE LETTER (OUTPATIENT)
Age: 44
End: 2024-06-29

## 2025-03-13 ENCOUNTER — APPOINTMENT (OUTPATIENT)
Dept: URBAN - METROPOLITAN AREA CLINIC 260 | Age: 45
Setting detail: DERMATOLOGY
End: 2025-03-13

## 2025-03-13 VITALS — WEIGHT: 155 LBS | HEIGHT: 65 IN

## 2025-03-13 DIAGNOSIS — D22 MELANOCYTIC NEVI: ICD-10-CM

## 2025-03-13 DIAGNOSIS — Z87.2 PERSONAL HISTORY OF DISEASES OF THE SKIN AND SUBCUTANEOUS TISSUE: ICD-10-CM

## 2025-03-13 DIAGNOSIS — D18.0 HEMANGIOMA: ICD-10-CM

## 2025-03-13 DIAGNOSIS — D49.2 NEOPLASM OF UNSPECIFIED BEHAVIOR OF BONE, SOFT TISSUE, AND SKIN: ICD-10-CM

## 2025-03-13 PROBLEM — D22.5 MELANOCYTIC NEVI OF TRUNK: Status: ACTIVE | Noted: 2025-03-13

## 2025-03-13 PROBLEM — D18.01 HEMANGIOMA OF SKIN AND SUBCUTANEOUS TISSUE: Status: ACTIVE | Noted: 2025-03-13

## 2025-03-13 PROCEDURE — OTHER MIPS QUALITY: OTHER

## 2025-03-13 PROCEDURE — 99213 OFFICE O/P EST LOW 20 MIN: CPT | Mod: 25

## 2025-03-13 PROCEDURE — 11102 TANGNTL BX SKIN SINGLE LES: CPT

## 2025-03-13 PROCEDURE — OTHER COUNSELING: OTHER

## 2025-03-13 PROCEDURE — OTHER BIOPSY BY SHAVE METHOD: OTHER

## 2025-03-13 ASSESSMENT — LOCATION DETAILED DESCRIPTION DERM
LOCATION DETAILED: LEFT INFERIOR UPPER BACK
LOCATION DETAILED: RIGHT SUPERIOR MEDIAL MIDBACK
LOCATION DETAILED: LEFT POSTERIOR SHOULDER

## 2025-03-13 ASSESSMENT — LOCATION SIMPLE DESCRIPTION DERM
LOCATION SIMPLE: LEFT SHOULDER
LOCATION SIMPLE: RIGHT LOWER BACK
LOCATION SIMPLE: LEFT UPPER BACK

## 2025-03-13 ASSESSMENT — LOCATION ZONE DERM
LOCATION ZONE: ARM
LOCATION ZONE: TRUNK

## 2025-03-13 NOTE — HPI: SKIN LESION
What Type Of Note Output Would You Prefer (Optional)?: Standard Output
Is This A New Presentation, Or A Follow-Up?: Mole
Additional History: Patient reports she noticed a mole on her left shoulder over the weekend. She notes it looks similar to spots that were previously biopsied.
6

## 2025-03-13 NOTE — PROCEDURE: BIOPSY BY SHAVE METHOD
Detail Level: Detailed
Depth Of Biopsy: dermis
Was A Bandage Applied: Yes
Size Of Lesion In Cm: 0
Biopsy Type: H and E
Biopsy Method: Dermablade
Anesthesia Type: 1% lidocaine with epinephrine
Anesthesia Volume In Cc: 0.5
Hemostasis: Drysol
Wound Care: Petrolatum
Dressing: bandage
Destruction After The Procedure: No
Type Of Destruction Used: Curettage
Curettage Text: The wound bed was treated with curettage after the biopsy was performed.
Cryotherapy Text: The wound bed was treated with cryotherapy after the biopsy was performed.
Electrodesiccation Text: The wound bed was treated with electrodesiccation after the biopsy was performed.
Electrodesiccation And Curettage Text: The wound bed was treated with electrodesiccation and curettage after the biopsy was performed.
Silver Nitrate Text: The wound bed was treated with silver nitrate after the biopsy was performed.
Lab: [id number]
Path Notes (To The Dermatopathologist): Rule out dysplastic
Medical Necessity Information: It is in your best interest to select a reason for this procedure from the list below. All of these items fulfill various CMS LCD requirements except the new and changing color options.
Consent: Written consent was obtained and risks were reviewed including but not limited to scarring, infection, bleeding, scabbing, incomplete removal, nerve damage and allergy to anesthesia.
Post-Care Instructions: I reviewed with the patient in detail post-care instructions. Patient is to keep the biopsy site dry overnight, and then apply bacitracin twice daily until healed. Patient may apply hydrogen peroxide soaks to remove any crusting.
Notification Instructions: Patient will be notified of biopsy results. However, patient instructed to call the office if not contacted within 2 weeks.
Billing Type: Third-Party Bill
Information: Selecting Yes will display possible errors in your note based on the variables you have selected. This validation is only offered as a suggestion for you. PLEASE NOTE THAT THE VALIDATION TEXT WILL BE REMOVED WHEN YOU FINALIZE YOUR NOTE. IF YOU WANT TO FAX A PRELIMINARY NOTE YOU WILL NEED TO TOGGLE THIS TO 'NO' IF YOU DO NOT WANT IT IN YOUR FAXED NOTE.

## 2025-07-13 ENCOUNTER — HEALTH MAINTENANCE LETTER (OUTPATIENT)
Age: 45
End: 2025-07-13

## (undated) DEVICE — SUCTION MANIFOLD NEPTUNE 2 SYS 1 PORT 702-025-000

## (undated) DEVICE — SURGICEL POWDER ABSORBABLE HEMOSTAT 3GM 3013SP

## (undated) DEVICE — Device

## (undated) DEVICE — PACK TRENGUARD 450 PROCEDURAL 2065406

## (undated) DEVICE — ENDO TROCAR CONMED AIRSEAL BLADELESS 08X120MM IAS8-120LP

## (undated) DEVICE — UTERINE MANIPULATOR RUMI 6.7MMX10CM UMG670

## (undated) DEVICE — DAVINCI XI DRAPE COLUMN 470341

## (undated) DEVICE — PROTECTOR ARM STANDARD ONE STEP

## (undated) DEVICE — PREP POVIDONE-IODINE 7.5% SCRUB 4OZ BOTTLE MDS093945

## (undated) DEVICE — DAVINCI XI OBTURATOR BLADELESS 8MM 470359

## (undated) DEVICE — DAVINCI XI DRAPE ARM 470015

## (undated) DEVICE — MAT FLOOR SURGICAL 40X38 0702140238

## (undated) DEVICE — ADAPTER DRAPE ALLY AU-AD

## (undated) DEVICE — PREP CHLORAPREP 26ML TINTED HI-LITE ORANGE 930815

## (undated) DEVICE — SYR 50ML LL W/O NDL 309653

## (undated) DEVICE — OCCLUDER VAGINAL PNEUMOSTOP

## (undated) DEVICE — DRAPE POUCH INSTRUMENT 3 POCKET 1018L

## (undated) DEVICE — CUSTOM PACK DA VINCI GYN SMA5BDVHEA

## (undated) DEVICE — CATH FOLEY 16FR 5ML LUBRICATH LATEX 0165L16

## (undated) DEVICE — NDL INSUFFLATION 13GA 120MM C2201

## (undated) DEVICE — TUBING IRRIG TUR Y TYPE 96" LF 6543-01

## (undated) DEVICE — ANTIFOG SOLUTION SEE SHARP 150M TROCAR SWABS 30978

## (undated) DEVICE — SU WND CLOSURE VLOC 180 ABS 0 12" GS-21 VLOCL0316

## (undated) DEVICE — GLOVE PI ULTRATCH M LF SZ 6.5 PF CUFF TEXT STRL LF 42665

## (undated) DEVICE — DRAPE SHEET TABLE COVER KC 42301*

## (undated) DEVICE — DEVICE RUMI II KOH-EFFICIENT 3.5CM KC-RUMI-35

## (undated) DEVICE — SYR 50ML CATH TIP W/O NDL 309620

## (undated) DEVICE — SUCTION TIP YANKAUER W/O VENT K86

## (undated) DEVICE — DAVINCI XI SEAL UNIVERSAL 5-12MM 470500

## (undated) DEVICE — BLADE KNIFE SURG 15 371115

## (undated) DEVICE — SUCTION STRYKERFLOW II 250-070-500

## (undated) DEVICE — SU VICRYL+ 0 27 UR6 VLT VCP603H

## (undated) DEVICE — SOL WATER IRRIG 1000ML BOTTLE 2F7114

## (undated) DEVICE — PREP POVIDONE-IODINE 10% SOLUTION 4OZ BOTTLE MDS093944

## (undated) DEVICE — DAVINCI HOT SHEARS TIP COVER  400180

## (undated) DEVICE — SOLUTION IV 2B0304X STRL WATER 1000ML

## (undated) DEVICE — BLADE KNIFE SURG 10 371110

## (undated) DEVICE — SURGICEL ENDOSCOPIC APPLICATOR FOR ORC POWDER 3123SPEA

## (undated) DEVICE — DRAPE U SPLIT 74X120" 29440

## (undated) DEVICE — SUTURE VICRYL+ 0 27IN CT-1 UND VCP260H

## (undated) DEVICE — DAVINCI XI HANDPIECE ESU VESSEL SEALER 8MM EXT 480422

## (undated) DEVICE — SYR 50ML SLIP TIP W/O NDL 309654

## (undated) DEVICE — LUBRICANT INST ELECTROLUBE EL101

## (undated) DEVICE — SYR 10ML LL W/O NDL 302995

## (undated) DEVICE — TUBING FILTER TRI-LUMEN AIRSEAL ASC-EVAC1

## (undated) DEVICE — SU MONOCRYL+ 4-0 18IN PS2 UND MCP496G

## (undated) DEVICE — PAD POS XL 1X20X40IN PINK PIGAZZI

## (undated) DEVICE — NEEDLE HYPO MAGELLAN SAFETY 22GA 1 1/2IN 8881850215

## (undated) DEVICE — TUBING SUCTION MEDI-VAC 1/4"X20' N620A

## (undated) RX ORDER — PROPOFOL 10 MG/ML
INJECTION, EMULSION INTRAVENOUS
Status: DISPENSED
Start: 2024-04-17

## (undated) RX ORDER — ONDANSETRON 2 MG/ML
INJECTION INTRAMUSCULAR; INTRAVENOUS
Status: DISPENSED
Start: 2024-04-17

## (undated) RX ORDER — FENTANYL CITRATE 50 UG/ML
INJECTION, SOLUTION INTRAMUSCULAR; INTRAVENOUS
Status: DISPENSED
Start: 2024-04-17

## (undated) RX ORDER — BUPIVACAINE HYDROCHLORIDE 2.5 MG/ML
INJECTION, SOLUTION EPIDURAL; INFILTRATION; INTRACAUDAL
Status: DISPENSED
Start: 2024-04-17

## (undated) RX ORDER — BUPIVACAINE HYDROCHLORIDE 2.5 MG/ML
INJECTION, SOLUTION INFILTRATION; PERINEURAL
Status: DISPENSED
Start: 2024-04-17

## (undated) RX ORDER — DEXAMETHASONE SODIUM PHOSPHATE 10 MG/ML
INJECTION, SOLUTION INTRAMUSCULAR; INTRAVENOUS
Status: DISPENSED
Start: 2024-04-17

## (undated) RX ORDER — LIDOCAINE HYDROCHLORIDE 10 MG/ML
INJECTION, SOLUTION EPIDURAL; INFILTRATION; INTRACAUDAL; PERINEURAL
Status: DISPENSED
Start: 2024-04-17

## (undated) RX ORDER — KETOROLAC TROMETHAMINE 30 MG/ML
INJECTION, SOLUTION INTRAMUSCULAR; INTRAVENOUS
Status: DISPENSED
Start: 2024-04-17